# Patient Record
Sex: MALE | Employment: STUDENT | URBAN - METROPOLITAN AREA
[De-identification: names, ages, dates, MRNs, and addresses within clinical notes are randomized per-mention and may not be internally consistent; named-entity substitution may affect disease eponyms.]

---
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
## 2019-05-20 ENCOUNTER — HOSPITAL ENCOUNTER (OUTPATIENT)
Age: 19
Discharge: HOME | End: 2019-05-20
Payer: COMMERCIAL

## 2019-05-20 VITALS
HEART RATE: 74 BPM | OXYGEN SATURATION: 100 % | SYSTOLIC BLOOD PRESSURE: 143 MMHG | BODY MASS INDEX: 30.31 KG/M2 | HEIGHT: 68 IN | DIASTOLIC BLOOD PRESSURE: 66 MMHG | RESPIRATION RATE: 20 BRPM | TEMPERATURE: 98 F | WEIGHT: 200 LBS

## 2019-05-20 DIAGNOSIS — H65.92 LEFT NON-SUPPURATIVE OTITIS MEDIA: Primary | ICD-10-CM

## 2019-05-20 PROCEDURE — 99203 OFFICE O/P NEW LOW 30 MIN: CPT

## 2019-05-20 PROCEDURE — 99204 OFFICE O/P NEW MOD 45 MIN: CPT

## 2019-05-20 RX ORDER — AMOXICILLIN 875 MG/1
875 TABLET, COATED ORAL 2 TIMES DAILY
Qty: 20 TABLET | Refills: 0 | Status: SHIPPED | OUTPATIENT
Start: 2019-05-20 | End: 2019-05-30

## 2019-05-21 NOTE — ED INITIAL ASSESSMENT (HPI)
Left ear pain with muffled hearing and congestion after airplane flight today. Trouble with balance. Feels lightheaded. No fever.

## 2019-05-21 NOTE — ED PROVIDER NOTES
Patient presents with:  Ear Pain      HPI:     Jorge Hardin is a 22year old male who presents with a chief complaint of left ear pain that started approximately 3 hours ago. The patient states he had some nasal congestion and then was on a plane.   He st phone: Not on file        Gets together: Not on file        Attends Sabian[employer] service: Not on file        Active member of club or organization: Not on file        Attends meetings of clubs or organizations: Not on file        Relationship status: Not on f treat the left otitis media with amoxicillin and recommend he continue supportive care and follow-up closely with his primary care doctor. Diagnosis:    ICD-10-CM    1.  Left non-suppurative otitis media H65.92        All results reviewed and discussed wit

## 2019-06-13 ENCOUNTER — OFFICE VISIT (OUTPATIENT)
Dept: FAMILY MEDICINE CLINIC | Facility: CLINIC | Age: 19
End: 2019-06-13

## 2019-06-13 VITALS
RESPIRATION RATE: 16 BRPM | TEMPERATURE: 98 F | BODY MASS INDEX: 32.42 KG/M2 | DIASTOLIC BLOOD PRESSURE: 62 MMHG | SYSTOLIC BLOOD PRESSURE: 118 MMHG | OXYGEN SATURATION: 99 % | HEART RATE: 52 BPM | HEIGHT: 67.2 IN | WEIGHT: 209 LBS

## 2019-06-13 DIAGNOSIS — Z00.00 PHYSICAL EXAM: Primary | ICD-10-CM

## 2019-06-13 DIAGNOSIS — Z11.1 ENCOUNTER FOR TB TINE TEST: ICD-10-CM

## 2019-06-13 PROCEDURE — 99385 PREV VISIT NEW AGE 18-39: CPT | Performed by: FAMILY MEDICINE

## 2019-06-13 PROCEDURE — 86580 TB INTRADERMAL TEST: CPT | Performed by: FAMILY MEDICINE

## 2019-06-13 NOTE — PATIENT INSTRUCTIONS
Normal exam today. TB skin test done today. Advice to have it read before 12 pm on Saturday.    Will bring in form

## 2019-06-13 NOTE — PROGRESS NOTES
[address]    Chief Complaint:   Patient presents with: Other: needs tb test  Physical      HPI:   Sridhar Griffith is a 22year old male who presents for an Annual Health Visit. Patient also needs TB test for his school.     Allergies bleeding  ENDOCRINE: denies excessive thirst or urination; denies unexpected wt gain or wt loss      EXAM:   /62 (BP Location: Left arm, Patient Position: Sitting, Cuff Size: large)   Pulse 52   Temp 98.2 °F (36.8 °C) (Tympanic)   Resp 16   Ht 67.2\" Saturday. Will bring in form       The patient indicates understanding of these issues and agrees to the plan.     Problem List:  Patient Active Problem List:     Routine infant or child health check      Alexi Howard MD  6/13/2019  5:43 PM    This note

## 2019-06-15 ENCOUNTER — NURSE ONLY (OUTPATIENT)
Dept: FAMILY MEDICINE CLINIC | Facility: CLINIC | Age: 19
End: 2019-06-15

## 2020-06-08 ENCOUNTER — OFFICE VISIT (OUTPATIENT)
Dept: INTERNAL MEDICINE CLINIC | Facility: CLINIC | Age: 20
End: 2020-06-08

## 2020-06-08 VITALS
HEIGHT: 68 IN | OXYGEN SATURATION: 99 % | TEMPERATURE: 96 F | WEIGHT: 180 LBS | DIASTOLIC BLOOD PRESSURE: 82 MMHG | HEART RATE: 91 BPM | BODY MASS INDEX: 27.28 KG/M2 | SYSTOLIC BLOOD PRESSURE: 140 MMHG | RESPIRATION RATE: 14 BRPM

## 2020-06-08 DIAGNOSIS — F17.200 NICOTINE USE DISORDER: ICD-10-CM

## 2020-06-08 DIAGNOSIS — J30.9 ALLERGIC RHINITIS, UNSPECIFIED SEASONALITY, UNSPECIFIED TRIGGER: ICD-10-CM

## 2020-06-08 DIAGNOSIS — T78.2XXS ANAPHYLAXIS, SEQUELA: Primary | ICD-10-CM

## 2020-06-08 PROCEDURE — 99204 OFFICE O/P NEW MOD 45 MIN: CPT | Performed by: INTERNAL MEDICINE

## 2020-06-08 RX ORDER — EPINEPHRINE 0.15 MG/.3ML
0.3 INJECTION INTRAMUSCULAR AS NEEDED
Qty: 2 EACH | Refills: 1 | Status: SHIPPED | OUTPATIENT
Start: 2020-06-08 | End: 2021-06-08

## 2020-06-08 RX ORDER — EPINEPHRINE 0.3 MG/.3ML
0.3 INJECTION SUBCUTANEOUS ONCE
COMMUNITY
End: 2020-07-23

## 2020-06-08 RX ORDER — CETIRIZINE HYDROCHLORIDE 10 MG/1
10 TABLET ORAL DAILY
COMMUNITY

## 2020-06-08 NOTE — PROGRESS NOTES
Ted Casanova is a 23year old male. HPI:   Patient presents with:  Establish Care: Pt would like to establish care with PCP. Pt has not had PCP before. Pt requesting referral for allergist after anaphylactic reaction last year to grape juice.        Valentine Saldivar Drinks per session: 5 or 6    Drug use: Yes      Types: Cannabis, Ecstasy       Medications (Active prior to today's visit):  Current Outpatient Medications   Medication Sig Dispense Refill   • EPINEPHrine (EPIPEN 2-CARMEN) 0.3 MG/0.3ML Injection Solution Aut non-tender and non-distended  Extremities: no clubbing, cyanosis or edema  Vascular: no carotid bruits; DP/PT 2/2  Musculoskeletal: Motor 5/5 upper and lower extremities  Neurological: cranial nerves II-XII intact; light touch and proprioception intact  Sk

## 2020-06-15 ENCOUNTER — NURSE ONLY (OUTPATIENT)
Dept: ALLERGY | Facility: CLINIC | Age: 20
End: 2020-06-15

## 2020-06-15 ENCOUNTER — OFFICE VISIT (OUTPATIENT)
Dept: ALLERGY | Facility: CLINIC | Age: 20
End: 2020-06-15

## 2020-06-15 VITALS
WEIGHT: 180 LBS | TEMPERATURE: 97 F | DIASTOLIC BLOOD PRESSURE: 72 MMHG | SYSTOLIC BLOOD PRESSURE: 156 MMHG | HEIGHT: 68 IN | OXYGEN SATURATION: 100 % | HEART RATE: 76 BPM | BODY MASS INDEX: 27.28 KG/M2

## 2020-06-15 DIAGNOSIS — J30.89 ENVIRONMENTAL AND SEASONAL ALLERGIES: ICD-10-CM

## 2020-06-15 DIAGNOSIS — J30.1 SEASONAL ALLERGIC RHINITIS DUE TO POLLEN: ICD-10-CM

## 2020-06-15 DIAGNOSIS — T78.40XA ALLERGIC REACTION, INITIAL ENCOUNTER: Primary | ICD-10-CM

## 2020-06-15 DIAGNOSIS — Z91.018 FOOD ALLERGY: ICD-10-CM

## 2020-06-15 DIAGNOSIS — J30.81 ALLERGIC RHINITIS DUE TO ANIMAL HAIR AND DANDER: ICD-10-CM

## 2020-06-15 PROCEDURE — 95024 IQ TESTS W/ALLERGENIC XTRCS: CPT | Performed by: ALLERGY & IMMUNOLOGY

## 2020-06-15 PROCEDURE — 95004 PERQ TESTS W/ALRGNC XTRCS: CPT | Performed by: ALLERGY & IMMUNOLOGY

## 2020-06-15 PROCEDURE — 99244 OFF/OP CNSLTJ NEW/EST MOD 40: CPT | Performed by: ALLERGY & IMMUNOLOGY

## 2020-06-15 NOTE — PATIENT INSTRUCTIONS
1. AR  Handouts on allergies and avoidance measures provided and reviewed including the potential treatment option of immunotherapy  Continue with Zyrtec 10 mg once a day as it is providing symptomatic relief of his predominant symptom of sneezing  May add

## 2020-06-15 NOTE — PROGRESS NOTES
Dimitri Brizuela is a 23year old male. HPI:   Patient presents with:   Allergies: per patient has a reaction last summer from [facility] grape juice, this past week had welches fruit snacks, felt weak, flushed, took Benadryl and felt better but did go to the Maternal Grandmother         Afib   • Cancer Paternal Grandmother         Thyroid   • Diabetes Neg    • Hypertension Neg    • Lipids Neg       Social History: Social History    Tobacco Use      Smoking status: Current Every Day Smoker      Smokeless tobacc NC/Atraumatic  Eyes/Vision: conjunctiva and lids are normal extraocular motion is intact   Ears/Audiometry: tympanic membranes are normal bilaterally hearing is grossly intact  Nose/Mouth/Throat: nose and throat are clear mucous membranes are moist   Neck/ it is providing symptomatic relief of his predominant symptom of sneezing  May add Flonase or Nasacort sprays per nostril once a day if refractory. May take up to 3 to 5 days to take full effect          2.  Food allergy/oas   Continue to avoid those foods

## 2020-07-23 ENCOUNTER — TELEPHONE (OUTPATIENT)
Dept: INTERNAL MEDICINE CLINIC | Facility: CLINIC | Age: 20
End: 2020-07-23

## 2020-07-23 RX ORDER — EPINEPHRINE 0.3 MG/.3ML
0.3 INJECTION SUBCUTANEOUS ONCE
Qty: 2 EACH | Refills: 1 | Status: SHIPPED | OUTPATIENT
Start: 2020-07-23 | End: 2020-07-23

## 2020-07-23 RX ORDER — EPINEPHRINE 0.3 MG/.3ML
0.3 INJECTION SUBCUTANEOUS ONCE
Qty: 1 EACH | Refills: 0 | Status: SHIPPED | OUTPATIENT
Start: 2020-07-23 | End: 2020-07-23

## 2020-07-23 NOTE — TELEPHONE ENCOUNTER
Aristides Singh calling to check on the medication below. [place] questioning the medication as the 0.15 mg is technically the 'arabella' version of he medication and is usually prescribed to people with a weight of 60lbs or less.      EPINEPHrine 0.15 MG/0.3ML Injec

## 2020-07-23 NOTE — TELEPHONE ENCOUNTER
Olga Lidia Acevedo called and informed new Rx sent for Epipen refilled with written order for 0.3mg noted in notes from office visit on 6/8/2020.

## 2020-07-24 NOTE — PROGRESS NOTES
Ted Casanova is a 23year old male.     HPI:   Patient presents with:  ADD: Patient wants to discuss ADD or ADHD and possible referrals      22 y/o M who reports long history of attention deficit symptoms; symptoms started in 2nd grade; he had increased ps nasal drainage  Eyes:  Negative for eye discharge and vision loss  Cardiovascular:  Negative for chest pain; negative palpitations  Respiratory:  Negative for cough, dyspnea and wheezing  Endocrine:  Negative for abnormal sleep patterns, increased activity allergist Dr De Los Santos Certain     Nicotine dependence  vapes nicotine daily; also smokes cannabis recreationally; recommended cessation of nicotine use     Allergic rhinitis  On Zyrtec 10 mg po qD        Cell [phone]  RTC 3 mos         Orders This Visit:  No ord

## 2020-08-03 NOTE — PROGRESS NOTES
Virtual Telephone Check-In    Carlo Ingram verbally consents to a Virtual/Telephone Check-In visit on 08/03/20. Patient has been referred to the [facility] website at [email or web address] to review the yearly Consent to Treat document.     Patient [place]

## 2020-09-28 ENCOUNTER — TELEPHONE (OUTPATIENT)
Dept: INTERNAL MEDICINE CLINIC | Facility: CLINIC | Age: 20
End: 2020-09-28

## 2020-09-28 NOTE — TELEPHONE ENCOUNTER
ADHD  refilled Vyvanse 30 mg po qD , #30, 2 RF; ERx sent to [place] in [place] at [facility]; please call pt

## 2020-09-28 NOTE — TELEPHONE ENCOUNTER
To MD:  The above refill request is for a controlled substance. Please review pended medication order. Print and sign for staff to fax to pharmacy or prescribe electronically.       [place]30/20     LAST REFILLED- 9/24/2020 #30/0

## 2020-10-01 NOTE — TELEPHONE ENCOUNTER
Spoke with pharmacist who reports patient recently picked up Rx on 22nd and still has 3 remaining scripts for next 3 months on file.

## 2020-10-01 NOTE — TELEPHONE ENCOUNTER
To MD:  The above refill request is for a controlled substance. Please review pended medication order. Print and sign for staff to fax to pharmacy or prescribe electronically.       90 day pended   [place]  8/23[place]30   Last refilled- 12/25/2020 #30

## 2020-10-01 NOTE — TELEPHONE ENCOUNTER
ERx was already sent 9/24 for 3 month supply (Vyvanse 30 mg po qD , #30, 2 RF); please verify with pharmacy

## 2020-10-02 ENCOUNTER — TELEPHONE (OUTPATIENT)
Dept: INTERNAL MEDICINE CLINIC | Facility: CLINIC | Age: 20
End: 2020-10-02

## 2020-10-02 NOTE — TELEPHONE ENCOUNTER
Please call pt regarding:  Vyvanse  Pt started this 3 months ago  Pt has some questions, might like to change to something different, not sure if it is working properly  Please call pt to discuss/advise  Tasked to nursing

## 2020-10-05 NOTE — TELEPHONE ENCOUNTER
To Dr. Danae Jiménez to please advise--  Spoke to patient who reports taking his Vyvanse 30 mg every morning at around 6:30-6:45am daily. Pt states he feels more engaged in the AM, and around 1-2PM daily, patient reports \"hitting a wall\" where he noticed mood changes, mild agitation, and pt reports he feels that he just wants to be left alone. 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 Pt denies any recent stressful events or increasing anxiety. Pt states after work, he takes a nap and will not be productive until the next work day. Reports difficulty managing out of work responsibilities due to this decrease in motivation and increased feeling of needing to rest and not be bothered after work.

## 2020-10-05 NOTE — TELEPHONE ENCOUNTER
Pt. Returned call he will be available after 4:30pm today and in the morning at from 8am to 11am ph.  # [phone]  Routed to clinical

## 2020-10-08 NOTE — TELEPHONE ENCOUNTER
Pt asked that Dr Leonce Siemens please call regarding his medication question  Pt is available today before 11:00 or after 4:30  Tasked to Dr Leonce Siemens

## 2020-10-30 ENCOUNTER — TELEPHONE (OUTPATIENT)
Dept: INTERNAL MEDICINE CLINIC | Facility: CLINIC | Age: 20
End: 2020-10-30

## 2020-10-30 DIAGNOSIS — R05.9 COUGH: ICD-10-CM

## 2020-10-30 DIAGNOSIS — R09.81 SINUS CONGESTION: ICD-10-CM

## 2020-10-30 DIAGNOSIS — J02.9 PHARYNGITIS, UNSPECIFIED ETIOLOGY: ICD-10-CM

## 2020-10-30 DIAGNOSIS — Z20.822 EXPOSURE TO COVID-19 VIRUS: Primary | ICD-10-CM

## 2020-10-30 NOTE — TELEPHONE ENCOUNTER
Pt called; +exposure to COVID; +cough; +sinus congestion; +pharyngitis    rec- nares COVID; (); pt called

## 2020-10-30 NOTE — TELEPHONE ENCOUNTER
Pt. Was exposed to someone who tested positive yesterday he was with her Tuesday evening. Please advise ph.  # [phone]   Routed high to clinical

## 2020-10-30 NOTE — TELEPHONE ENCOUNTER
Spoke to patient who reports he person he was with was tested Tuesday by rapid test around 1:30pm and had a negative result.  He then saw this person Tuesday around 6pm, they did not have masks on, were probably closer than 6 feet, and were together for abo

## 2020-10-31 ENCOUNTER — APPOINTMENT (OUTPATIENT)
Dept: LAB | Age: 20
End: 2020-10-31
Attending: INTERNAL MEDICINE
Payer: COMMERCIAL

## 2020-10-31 DIAGNOSIS — J02.9 PHARYNGITIS, UNSPECIFIED ETIOLOGY: ICD-10-CM

## 2020-10-31 DIAGNOSIS — R05.9 COUGH: ICD-10-CM

## 2020-10-31 DIAGNOSIS — Z20.822 EXPOSURE TO COVID-19 VIRUS: ICD-10-CM

## 2020-10-31 DIAGNOSIS — R09.81 SINUS CONGESTION: ICD-10-CM

## 2020-11-04 ENCOUNTER — TELEPHONE (OUTPATIENT)
Dept: INTERNAL MEDICINE CLINIC | Facility: CLINIC | Age: 20
End: 2020-11-04

## 2020-11-09 ENCOUNTER — PATIENT MESSAGE (OUTPATIENT)
Dept: INTERNAL MEDICINE CLINIC | Facility: CLINIC | Age: 20
End: 2020-11-09

## 2020-11-09 ENCOUNTER — TELEPHONE (OUTPATIENT)
Dept: INTERNAL MEDICINE CLINIC | Facility: CLINIC | Age: 20
End: 2020-11-09

## 2020-11-09 NOTE — TELEPHONE ENCOUNTER
Rekha Hurtado  to Aniceto Lei MD          11/9/20 9:28 AM  I have been a two phone calls over the past month with two nurses regarding my current prescription for Vyvans and the possibility of changing it.  Ending both calls, the nurses said that I

## 2020-11-10 RX ORDER — DEXTROAMPHETAMINE SACCHARATE, AMPHETAMINE ASPARTATE, DEXTROAMPHETAMINE SULFATE AND AMPHETAMINE SULFATE 5; 5; 5; 5 MG/1; MG/1; MG/1; MG/1
20 TABLET ORAL 2 TIMES DAILY
Qty: 60 TABLET | Refills: 0 | Status: SHIPPED | OUTPATIENT
Start: 2020-11-10 | End: 2020-12-10

## 2020-11-10 RX ORDER — DEXTROAMPHETAMINE SACCHARATE, AMPHETAMINE ASPARTATE, DEXTROAMPHETAMINE SULFATE AND AMPHETAMINE SULFATE 5; 5; 5; 5 MG/1; MG/1; MG/1; MG/1
20 TABLET ORAL 2 TIMES DAILY
Qty: 60 TABLET | Refills: 0 | Status: SHIPPED | OUTPATIENT
Start: 2020-12-11 | End: 2021-01-10

## 2020-11-10 RX ORDER — DEXTROAMPHETAMINE SACCHARATE, AMPHETAMINE ASPARTATE, DEXTROAMPHETAMINE SULFATE AND AMPHETAMINE SULFATE 5; 5; 5; 5 MG/1; MG/1; MG/1; MG/1
20 TABLET ORAL 2 TIMES DAILY
Qty: 60 TABLET | Refills: 0 | Status: SHIPPED | OUTPATIENT
Start: 2021-01-11 | End: 2021-02-10

## 2020-11-10 NOTE — TELEPHONE ENCOUNTER
Patient returning Dr Love Aguiar phone call from last night    Patient is aware Dr is out of the office okay to wait

## 2020-11-10 NOTE — TELEPHONE ENCOUNTER
Imp- ADHD  Has had indolent sxs x many years with +increased distractibility, +poor concentration, and +increased psychomotor agitation  -was on Vyvanse 30 mg po qD with less distractibility and improved concentration; sxs \"wear off\" around noon    Rec-

## 2020-12-02 ENCOUNTER — OFFICE VISIT (OUTPATIENT)
Dept: INTERNAL MEDICINE CLINIC | Facility: CLINIC | Age: 20
End: 2020-12-02

## 2020-12-02 VITALS
TEMPERATURE: 98 F | SYSTOLIC BLOOD PRESSURE: 144 MMHG | OXYGEN SATURATION: 100 % | BODY MASS INDEX: 28.19 KG/M2 | WEIGHT: 186 LBS | HEART RATE: 76 BPM | HEIGHT: 68 IN | DIASTOLIC BLOOD PRESSURE: 72 MMHG

## 2020-12-02 DIAGNOSIS — F98.8 ATTENTION DEFICIT DISORDER, UNSPECIFIED HYPERACTIVITY PRESENCE: ICD-10-CM

## 2020-12-02 DIAGNOSIS — T78.2XXS ANAPHYLAXIS, SEQUELA: ICD-10-CM

## 2020-12-02 DIAGNOSIS — Z00.00 PHYSICAL EXAM, ANNUAL: Primary | ICD-10-CM

## 2020-12-02 DIAGNOSIS — J30.9 ALLERGIC RHINITIS, UNSPECIFIED SEASONALITY, UNSPECIFIED TRIGGER: ICD-10-CM

## 2020-12-02 DIAGNOSIS — F17.200 NICOTINE USE DISORDER: ICD-10-CM

## 2020-12-02 DIAGNOSIS — M79.673 PAIN OF FOOT, UNSPECIFIED LATERALITY: ICD-10-CM

## 2020-12-02 PROCEDURE — 3077F SYST BP >= 140 MM HG: CPT | Performed by: INTERNAL MEDICINE

## 2020-12-02 PROCEDURE — G0438 PPPS, INITIAL VISIT: HCPCS | Performed by: INTERNAL MEDICINE

## 2020-12-02 PROCEDURE — 3078F DIAST BP <80 MM HG: CPT | Performed by: INTERNAL MEDICINE

## 2020-12-02 PROCEDURE — 3008F BODY MASS INDEX DOCD: CPT | Performed by: INTERNAL MEDICINE

## 2020-12-02 PROCEDURE — 99395 PREV VISIT EST AGE 18-39: CPT | Performed by: INTERNAL MEDICINE

## 2020-12-02 NOTE — PROGRESS NOTES
Dimitry Chacon is a 23year old male. HPI:   Patient presents with:  Physical: Patient reports he is having some pain in his feet.        22 y/o M here for physical exam; c/o pain to feet; states ADD has responded well to Adderall 20 mg po BID with decrea Negative for ear drainage, hearing loss and nasal drainage  Eyes:  Negative for eye discharge and vision loss  Cardiovascular:  Negative for chest pain; negative palpitations  Respiratory:  Negative for cough, dyspnea and wheezing  Endocrine:  Negative for +increased psychomotor agitation  -was on Vyvanse 30 mg po qD with less distractibility, though med \"wears off\" around noon  - stopped Vyvanse on 11/9/20  -now on Adderall 20 mg po BID, #60, 1RF     Foot pain   Advised Spenco orthotic arch suport    Fati

## 2021-01-07 ENCOUNTER — TELEPHONE (OUTPATIENT)
Dept: INTERNAL MEDICINE CLINIC | Facility: CLINIC | Age: 21
End: 2021-01-07

## 2021-01-07 RX ORDER — DEXTROAMPHETAMINE SACCHARATE, AMPHETAMINE ASPARTATE, DEXTROAMPHETAMINE SULFATE AND AMPHETAMINE SULFATE 5; 5; 5; 5 MG/1; MG/1; MG/1; MG/1
20 TABLET ORAL 2 TIMES DAILY
Qty: 60 TABLET | Refills: 0 | Status: SHIPPED | OUTPATIENT
Start: 2021-01-07 | End: 2021-02-06

## 2021-01-07 RX ORDER — DEXTROAMPHETAMINE SACCHARATE, AMPHETAMINE ASPARTATE, DEXTROAMPHETAMINE SULFATE AND AMPHETAMINE SULFATE 5; 5; 5; 5 MG/1; MG/1; MG/1; MG/1
20 TABLET ORAL 2 TIMES DAILY
Qty: 60 TABLET | Refills: 0 | Status: SHIPPED | OUTPATIENT
Start: 2021-02-07 | End: 2021-03-09

## 2021-01-07 RX ORDER — DEXTROAMPHETAMINE SACCHARATE, AMPHETAMINE ASPARTATE, DEXTROAMPHETAMINE SULFATE AND AMPHETAMINE SULFATE 5; 5; 5; 5 MG/1; MG/1; MG/1; MG/1
20 TABLET ORAL 2 TIMES DAILY
Qty: 60 TABLET | Refills: 0 | Status: SHIPPED | OUTPATIENT
Start: 2021-03-10 | End: 2021-04-01

## 2021-01-07 NOTE — TELEPHONE ENCOUNTER
Patient is calling he will be leaving town tomorrow evening  His prescription for Aderall 20 mg can't be refilled until 1/10 he needs to pick the medication up early to take with him    Patient called the pharmacy they told him to call the office and have

## 2021-01-07 NOTE — TELEPHONE ENCOUNTER
Imp- ADHD     Rec- ERx sent to [place] in [place] at [employer] [facility] for Adderall 20 mg po BID, #60, 2RF; note to pharmacy \"may refill early\"    Please notify pt

## 2021-03-09 ENCOUNTER — TELEPHONE (OUTPATIENT)
Dept: INTERNAL MEDICINE CLINIC | Facility: CLINIC | Age: 21
End: 2021-03-09

## 2021-03-09 ENCOUNTER — PATIENT MESSAGE (OUTPATIENT)
Dept: INTERNAL MEDICINE CLINIC | Facility: CLINIC | Age: 21
End: 2021-03-09

## 2021-03-09 RX ORDER — DEXTROAMPHETAMINE SACCHARATE, AMPHETAMINE ASPARTATE, DEXTROAMPHETAMINE SULFATE AND AMPHETAMINE SULFATE 5; 5; 5; 5 MG/1; MG/1; MG/1; MG/1
20 TABLET ORAL 2 TIMES DAILY
Qty: 60 TABLET | Refills: 0 | OUTPATIENT
Start: 2021-03-09 | End: 2021-04-08

## 2021-03-09 NOTE — TELEPHONE ENCOUNTER
From: May Gentile  To: [place] Moyer MD  Sent: 3/9/2021 10:19 AM CST  Subject: Prescription Question    I was curious about getting my Adderall prescription updated for the next few months because I will be receiving my last one today/tomorrow.

## 2021-03-09 NOTE — TELEPHONE ENCOUNTER
To Dr. Pennington Raudel---    Pt requesting refill panel. Pended.      LOV: 12/2020  Last rx sent 3/9/21

## 2021-03-09 NOTE — TELEPHONE ENCOUNTER
Current refill request refused due to refill is either a duplicate request or has active refills at the pharmacy. Check previous templates.     Requested Prescriptions     Refused Prescriptions Disp Refills   • amphetamine-dextroamphetamine (ADDERALL) 20 M

## 2021-03-10 RX ORDER — DEXTROAMPHETAMINE SACCHARATE, AMPHETAMINE ASPARTATE, DEXTROAMPHETAMINE SULFATE AND AMPHETAMINE SULFATE 5; 5; 5; 5 MG/1; MG/1; MG/1; MG/1
20 TABLET ORAL 2 TIMES DAILY
Qty: 60 TABLET | Refills: 0 | Status: SHIPPED | OUTPATIENT
Start: 2021-04-09 | End: 2021-04-01

## 2021-03-10 RX ORDER — DEXTROAMPHETAMINE SACCHARATE, AMPHETAMINE ASPARTATE, DEXTROAMPHETAMINE SULFATE AND AMPHETAMINE SULFATE 5; 5; 5; 5 MG/1; MG/1; MG/1; MG/1
20 TABLET ORAL 2 TIMES DAILY
Qty: 60 TABLET | Refills: 0 | Status: SHIPPED | OUTPATIENT
Start: 2021-05-10 | End: 2021-04-01

## 2021-03-11 NOTE — TELEPHONE ENCOUNTER
Imp- ADHD     Rec- ERx sent to [place] in [place] at [facility] for Adderall 20 mg po BID, #60, 1RF;

## 2021-04-01 ENCOUNTER — TELEPHONE (OUTPATIENT)
Dept: INTERNAL MEDICINE CLINIC | Facility: CLINIC | Age: 21
End: 2021-04-01

## 2021-04-01 RX ORDER — DEXTROAMPHETAMINE SACCHARATE, AMPHETAMINE ASPARTATE, DEXTROAMPHETAMINE SULFATE AND AMPHETAMINE SULFATE 3.75; 3.75; 3.75; 3.75 MG/1; MG/1; MG/1; MG/1
15 TABLET ORAL 3 TIMES DAILY
Qty: 90 TABLET | Refills: 0 | Status: SHIPPED | OUTPATIENT
Start: 2021-04-01 | End: 2021-04-05

## 2021-04-01 RX ORDER — DEXTROAMPHETAMINE SACCHARATE, AMPHETAMINE ASPARTATE, DEXTROAMPHETAMINE SULFATE AND AMPHETAMINE SULFATE 3.75; 3.75; 3.75; 3.75 MG/1; MG/1; MG/1; MG/1
15 TABLET ORAL 3 TIMES DAILY
Qty: 90 TABLET | Refills: 0 | Status: SHIPPED | OUTPATIENT
Start: 2021-05-02 | End: 2021-05-24

## 2021-04-01 RX ORDER — DEXTROAMPHETAMINE SACCHARATE, AMPHETAMINE ASPARTATE, DEXTROAMPHETAMINE SULFATE AND AMPHETAMINE SULFATE 3.75; 3.75; 3.75; 3.75 MG/1; MG/1; MG/1; MG/1
15 TABLET ORAL 3 TIMES DAILY
Qty: 90 TABLET | Refills: 0 | Status: SHIPPED | OUTPATIENT
Start: 2021-06-02 | End: 2021-05-24

## 2021-04-01 NOTE — TELEPHONE ENCOUNTER
ADHD  Has had indolent sxs x many years with +increased distractibility, +poor concentration, and +increased psychomotor agitation  -improved on Vyvanse 30 mg po qD with less distractibility and improved concentration; CPM for now  -Has had indolent sxs x

## 2021-04-01 NOTE — TELEPHONE ENCOUNTER
----- Message from Sridhar Griffith sent at 4/1/2021  4:59 PM CDT -----  Regarding: Prescription Question  Contact: [phone]  I am curious about potentially altering my current adderall prescription.  I recently have changed jobs and my work goes further i

## 2021-04-02 NOTE — TELEPHONE ENCOUNTER
Discussed with DR. CRENSHAW and pt - options (as pt is not out of med)  Insurance for BID and cash for every day doses;  PA process;   Change to extended release and IR tablet daily  Pt will get the first month from the pharmacy and I will process the PA as decide

## 2021-04-02 NOTE — TELEPHONE ENCOUNTER
Prior [employer] received from [place] for Adderall  Max daily dose of 2  Plan # [phone]  ID# [id number]    Placed in purple folder

## 2021-04-05 RX ORDER — DEXTROAMPHETAMINE SACCHARATE, AMPHETAMINE ASPARTATE, DEXTROAMPHETAMINE SULFATE AND AMPHETAMINE SULFATE 3.75; 3.75; 3.75; 3.75 MG/1; MG/1; MG/1; MG/1
15 TABLET ORAL 3 TIMES DAILY
Qty: 90 TABLET | Refills: 0 | Status: CANCELLED | OUTPATIENT
Start: 2021-05-06 | End: 2021-06-05

## 2021-04-05 RX ORDER — DEXTROAMPHETAMINE SACCHARATE, AMPHETAMINE ASPARTATE, DEXTROAMPHETAMINE SULFATE AND AMPHETAMINE SULFATE 3.75; 3.75; 3.75; 3.75 MG/1; MG/1; MG/1; MG/1
15 TABLET ORAL 3 TIMES DAILY
Qty: 90 TABLET | Refills: 0 | Status: CANCELLED | OUTPATIENT
Start: 2021-04-05 | End: 2021-05-05

## 2021-04-05 RX ORDER — DEXTROAMPHETAMINE SACCHARATE, AMPHETAMINE ASPARTATE, DEXTROAMPHETAMINE SULFATE AND AMPHETAMINE SULFATE 3.75; 3.75; 3.75; 3.75 MG/1; MG/1; MG/1; MG/1
15 TABLET ORAL 3 TIMES DAILY
Qty: 90 TABLET | Refills: 0 | Status: SHIPPED | OUTPATIENT
Start: 2021-04-05 | End: 2021-04-05

## 2021-04-05 RX ORDER — DEXTROAMPHETAMINE SACCHARATE, AMPHETAMINE ASPARTATE, DEXTROAMPHETAMINE SULFATE AND AMPHETAMINE SULFATE 3.75; 3.75; 3.75; 3.75 MG/1; MG/1; MG/1; MG/1
15 TABLET ORAL 3 TIMES DAILY
Qty: 90 TABLET | Refills: 0 | Status: CANCELLED | OUTPATIENT
Start: 2021-06-06 | End: 2021-07-06

## 2021-04-05 RX ORDER — DEXTROAMPHETAMINE SACCHARATE, AMPHETAMINE ASPARTATE, DEXTROAMPHETAMINE SULFATE AND AMPHETAMINE SULFATE 3.75; 3.75; 3.75; 3.75 MG/1; MG/1; MG/1; MG/1
15 TABLET ORAL 3 TIMES DAILY
Qty: 90 TABLET | Refills: 0 | Status: SHIPPED | OUTPATIENT
Start: 2021-04-05 | End: 2021-05-05

## 2021-04-05 NOTE — TELEPHONE ENCOUNTER
Spoke to pt - explained for future reference, with the regulations on these prescriptions, we cannot send/void/send ; He will need to check prior to request;  I specifically asked if they have #90 tabs because otherwise he will have to forfeit tabs;   He w

## 2021-04-05 NOTE — TELEPHONE ENCOUNTER
Dr. Christopher Pemberton, please advise. I called [facility] in [place] and they are unable to transfer a script for Adderall and will need a new one sent to them directly. They do have the 15 mg tablets in stock.

## 2021-04-05 NOTE — TELEPHONE ENCOUNTER
Patient calling today to see if the office can change the prescription below to another pharmacy. Patient was informed the script was sent to the [facility] on [address] on 4/1/2021.  Patient verbalized that he knew this but the pharmacy informed hi

## 2021-04-05 NOTE — TELEPHONE ENCOUNTER
Called [facility] on [address] to confirm and void Rx sent there; They are out of stock and did not fill Rx  Pended for DR. CRENSHAW to send to [facility] on [address] (only 4/1/21 Rx)

## 2021-04-05 NOTE — TELEPHONE ENCOUNTER
[facility] on [address] called, they do not have medication in stock  Pt did not call them prior to having RX transferred per pharmacy  Pt called [facility] on [address] and said he called [facility] and they do have medication in stock  Please transfer RX

## 2021-04-07 NOTE — TELEPHONE ENCOUNTER
Prior Authorization rec'd from [facility] for:  Amphetamine-Dextroamphetamine  Fax placed in purple folder  Tasked to [employer]

## 2021-04-12 NOTE — TELEPHONE ENCOUNTER
[facility] rec'd for:  Amphetamine-Dextroamphetamine    \"the dose you are requesting is more than the highest quantity covered by your pharmacy plan.   Your prescriber must provide information to support the higher requested quantity\"    Fax placed in purple

## 2021-05-24 ENCOUNTER — PATIENT MESSAGE (OUTPATIENT)
Dept: INTERNAL MEDICINE CLINIC | Facility: CLINIC | Age: 21
End: 2021-05-24

## 2021-05-24 ENCOUNTER — TELEPHONE (OUTPATIENT)
Dept: INTERNAL MEDICINE CLINIC | Facility: CLINIC | Age: 21
End: 2021-05-24

## 2021-05-24 RX ORDER — DEXTROAMPHETAMINE SACCHARATE, AMPHETAMINE ASPARTATE, DEXTROAMPHETAMINE SULFATE AND AMPHETAMINE SULFATE 7.5; 7.5; 7.5; 7.5 MG/1; MG/1; MG/1; MG/1
30 TABLET ORAL 2 TIMES DAILY
Qty: 60 TABLET | Refills: 0 | Status: SHIPPED | OUTPATIENT
Start: 2021-05-24 | End: 2021-06-23

## 2021-05-24 RX ORDER — DEXTROAMPHETAMINE SACCHARATE, AMPHETAMINE ASPARTATE, DEXTROAMPHETAMINE SULFATE AND AMPHETAMINE SULFATE 7.5; 7.5; 7.5; 7.5 MG/1; MG/1; MG/1; MG/1
30 TABLET ORAL 2 TIMES DAILY
Qty: 60 TABLET | Refills: 0 | Status: SHIPPED | OUTPATIENT
Start: 2021-07-25 | End: 2021-08-24

## 2021-05-24 RX ORDER — DEXTROAMPHETAMINE SACCHARATE, AMPHETAMINE ASPARTATE, DEXTROAMPHETAMINE SULFATE AND AMPHETAMINE SULFATE 7.5; 7.5; 7.5; 7.5 MG/1; MG/1; MG/1; MG/1
30 TABLET ORAL 2 TIMES DAILY
Qty: 60 TABLET | Refills: 0 | Status: SHIPPED | OUTPATIENT
Start: 2021-06-24 | End: 2021-07-24

## 2021-05-24 NOTE — TELEPHONE ENCOUNTER
From: Naima Brandt  To: Mariposa Mixon MD  Sent: 5/24/2021 1:16 PM CDT  Subject: Prescription Question    Unfortunately, my insurance does not cover the cost of my new prescription.  However, I am finding a this new prescription incredibly beneficial. I

## 2021-05-24 NOTE — TELEPHONE ENCOUNTER
Imp- ADD    Insurance will not cover Adderall 15 mg po TID    Rec- change Rx to Adderall 30 mg po BID #60, 2 RF    [place] in [place] at [place] / [address]    Pt called; ERx sent

## 2021-05-24 NOTE — TELEPHONE ENCOUNTER
----- Message from Ayanna Stern sent at 5/24/2021  1:16 PM CDT -----  Regarding: Prescription Question  Contact: [phone]  Unfortunately, my insurance does not cover the cost of my new prescription.  However, I am finding a this new prescription incred

## 2021-06-14 ENCOUNTER — TELEPHONE (OUTPATIENT)
Dept: INTERNAL MEDICINE CLINIC | Facility: CLINIC | Age: 21
End: 2021-06-14

## 2021-06-14 ENCOUNTER — HOSPITAL ENCOUNTER (EMERGENCY)
Facility: HOSPITAL | Age: 21
Discharge: HOME | End: 2021-06-14
Attending: EMERGENCY MEDICINE
Payer: COMMERCIAL

## 2021-06-14 VITALS
RESPIRATION RATE: 16 BRPM | SYSTOLIC BLOOD PRESSURE: 136 MMHG | TEMPERATURE: 99 F | HEART RATE: 64 BPM | OXYGEN SATURATION: 100 % | DIASTOLIC BLOOD PRESSURE: 77 MMHG

## 2021-06-14 DIAGNOSIS — R25.3 MUSCLE FASCICULATION: Primary | ICD-10-CM

## 2021-06-14 PROCEDURE — 99282 EMERGENCY DEPT VISIT SF MDM: CPT

## 2021-06-14 NOTE — TELEPHONE ENCOUNTER
Patient calling to speak to Dr. Sd Collins for advise. Has been feeling pulsing/contractions in stomach/kidney area. Happens occasionally. Not painful. Has been going on for a few months.     Best call back number [phone]

## 2021-06-14 NOTE — ED INITIAL ASSESSMENT (HPI)
Dax Acosta is here for evaluation of episode of \"twitching\" in right-side abdomen for past several months. C/o fatigue for past several days.

## 2021-06-14 NOTE — TELEPHONE ENCOUNTER
Pt reports developing right sided lower rib cage muscle twitching, described also as \"pulsating\" and \"contractions\" by the patient. Reports this has began 1 month ago and for the past few days has been experiencing this daily.  Reports pain level is 2/1

## 2021-06-15 NOTE — TELEPHONE ENCOUNTER
Patient was seen in the emergency room last night. Left message to call back to see how he is feeling.

## 2021-06-15 NOTE — TELEPHONE ENCOUNTER
Patient is calling back after missing a call from nursing. Patient states he is feeling perfectly fine, everything checked out good, no issues. Patient stated he does not need a follow up appointment.

## 2021-06-15 NOTE — TELEPHONE ENCOUNTER
Message noted.  Per ER documentation, patient was instructed on PCP follow up as follows:      \"Schedule an appointment as soon as possible for a visit  As needed\"

## 2021-06-15 NOTE — ED PROVIDER NOTES
Patient Seen in: [facility] AND [facility] Emergency Department    History   Patient presents with:  Abdomen/Flank Pain      HPI    patient presents to the ED after complaining of intermittent twitching since symptoms to his right upper abdomen, lower chest wall Temp src    SpO2 100 %   O2 Device        All measures to prevent infection transmission during my interaction with the patient were taken. The patient was already wearing a droplet mask on my arrival to the room.  Personal protective equipment including Diagnosis/ Diagnostic Considerations: Muscle strain, fasciculations, spasms    Medical Record Review: I personally reviewed available prior medical records for any recent pertinent discharge summaries, testing, and procedures and reviewed those reports.

## 2021-07-16 ENCOUNTER — PATIENT MESSAGE (OUTPATIENT)
Dept: INTERNAL MEDICINE CLINIC | Facility: CLINIC | Age: 21
End: 2021-07-16

## 2021-07-16 ENCOUNTER — TELEPHONE (OUTPATIENT)
Dept: INTERNAL MEDICINE CLINIC | Facility: CLINIC | Age: 21
End: 2021-07-16

## 2021-07-16 RX ORDER — DEXTROAMPHETAMINE SACCHARATE, AMPHETAMINE ASPARTATE, DEXTROAMPHETAMINE SULFATE AND AMPHETAMINE SULFATE 7.5; 7.5; 7.5; 7.5 MG/1; MG/1; MG/1; MG/1
30 TABLET ORAL 2 TIMES DAILY
Qty: 60 TABLET | Refills: 0 | Status: SHIPPED | OUTPATIENT
Start: 2021-07-16

## 2021-07-16 NOTE — TELEPHONE ENCOUNTER
Imp- ADD     on Adderall 30 mg po BID #60,      [place] in [place] at [place] / [place]     May refill early    ERx sent

## 2021-07-16 NOTE — TELEPHONE ENCOUNTER
----- Message from Carlo Ingram sent at 7/16/2021  6:42 AM CDT -----  Regarding: Prescription Question  Contact: [phone]  I am going out of town on Tuesday 7/20 and I will not be back in town until the Friday 7/30.  My prescription is scheduled to be

## 2021-07-16 NOTE — TELEPHONE ENCOUNTER
From: Sylvia Niño  To: Floyd Rolon MD  Sent: 7/16/2021 6:42 AM CDT  Subject: Prescription Question    I am going out of town on Tuesday 7/20 and I will not be back in town until the Friday 7/30. My prescription is scheduled to be refilled on 7/25.

## 2021-08-23 ENCOUNTER — TELEPHONE (OUTPATIENT)
Dept: INTERNAL MEDICINE CLINIC | Facility: CLINIC | Age: 21
End: 2021-08-23

## 2021-08-23 RX ORDER — DEXTROAMPHETAMINE SACCHARATE, AMPHETAMINE ASPARTATE, DEXTROAMPHETAMINE SULFATE AND AMPHETAMINE SULFATE 7.5; 7.5; 7.5; 7.5 MG/1; MG/1; MG/1; MG/1
30 TABLET ORAL 2 TIMES DAILY
Qty: 60 TABLET | Refills: 0 | Status: SHIPPED | OUTPATIENT
Start: 2021-09-25 | End: 2021-10-25

## 2021-08-23 RX ORDER — DEXTROAMPHETAMINE SACCHARATE, AMPHETAMINE ASPARTATE, DEXTROAMPHETAMINE SULFATE AND AMPHETAMINE SULFATE 7.5; 7.5; 7.5; 7.5 MG/1; MG/1; MG/1; MG/1
30 TABLET ORAL 2 TIMES DAILY
Qty: 60 TABLET | Refills: 0 | Status: SHIPPED | OUTPATIENT
Start: 2021-08-25 | End: 2021-09-24

## 2021-08-23 RX ORDER — DEXTROAMPHETAMINE SACCHARATE, AMPHETAMINE ASPARTATE, DEXTROAMPHETAMINE SULFATE AND AMPHETAMINE SULFATE 7.5; 7.5; 7.5; 7.5 MG/1; MG/1; MG/1; MG/1
30 TABLET ORAL 2 TIMES DAILY
Qty: 60 TABLET | Refills: 0 | Status: CANCELLED | OUTPATIENT
Start: 2021-08-23 | End: 2021-09-22

## 2021-08-23 RX ORDER — DEXTROAMPHETAMINE SACCHARATE, AMPHETAMINE ASPARTATE, DEXTROAMPHETAMINE SULFATE AND AMPHETAMINE SULFATE 7.5; 7.5; 7.5; 7.5 MG/1; MG/1; MG/1; MG/1
30 TABLET ORAL 2 TIMES DAILY
Qty: 60 TABLET | Refills: 0 | Status: SHIPPED | OUTPATIENT
Start: 2021-10-26 | End: 2021-11-25

## 2021-08-23 NOTE — TELEPHONE ENCOUNTER
To MD:  The above refill request is for a controlled substance. Please review pended medication order. Print and sign for staff to fax to pharmacy or prescribe electronically.     Last office visit:12/2/2020  Last time refill sent and quantity/refills:7/

## 2021-08-23 NOTE — TELEPHONE ENCOUNTER
Imp- ADD     on Adderall 30 mg po BID #60, 2 RF     [place] in [facility] at [place] / [address] [place] [address] sent

## 2021-09-09 RX ORDER — DEXTROAMPHETAMINE SACCHARATE, AMPHETAMINE ASPARTATE, DEXTROAMPHETAMINE SULFATE AND AMPHETAMINE SULFATE 7.5; 7.5; 7.5; 7.5 MG/1; MG/1; MG/1; MG/1
30 TABLET ORAL 2 TIMES DAILY
Qty: 60 TABLET | Refills: 0 | Status: SHIPPED | OUTPATIENT
Start: 2021-11-23 | End: 2021-12-23

## 2021-09-09 RX ORDER — DEXTROAMPHETAMINE SACCHARATE, AMPHETAMINE ASPARTATE, DEXTROAMPHETAMINE SULFATE AND AMPHETAMINE SULFATE 7.5; 7.5; 7.5; 7.5 MG/1; MG/1; MG/1; MG/1
30 TABLET ORAL 2 TIMES DAILY
Qty: 60 TABLET | Refills: 0 | Status: SHIPPED | OUTPATIENT
Start: 2021-10-24 | End: 2021-11-23

## 2021-09-09 RX ORDER — DEXTROAMPHETAMINE SACCHARATE, AMPHETAMINE ASPARTATE, DEXTROAMPHETAMINE SULFATE AND AMPHETAMINE SULFATE 7.5; 7.5; 7.5; 7.5 MG/1; MG/1; MG/1; MG/1
30 TABLET ORAL 2 TIMES DAILY
Qty: 60 TABLET | Refills: 0 | OUTPATIENT
Start: 2021-09-09 | End: 2021-10-09

## 2021-09-09 RX ORDER — DEXTROAMPHETAMINE SACCHARATE, AMPHETAMINE ASPARTATE, DEXTROAMPHETAMINE SULFATE AND AMPHETAMINE SULFATE 7.5; 7.5; 7.5; 7.5 MG/1; MG/1; MG/1; MG/1
30 TABLET ORAL 2 TIMES DAILY
Qty: 60 TABLET | Refills: 0 | Status: SHIPPED | OUTPATIENT
Start: 2021-09-24 | End: 2021-10-24

## 2021-09-09 NOTE — TELEPHONE ENCOUNTER
Imp- ADD     on Adderall 30 mg po BID #60, 2 RF     Rx printed starting 9/24/21; pt called;  Rx left at window for pick-up

## 2021-09-09 NOTE — TELEPHONE ENCOUNTER
Pt. Called. He will be moving to [place] for the school year. He is asking if his Adderall Prescription can be sent to a pharmacy down there while he is at school? If so, he would like it to go to [facility] in [place], [place].   Francisco phone number

## 2021-09-10 NOTE — TELEPHONE ENCOUNTER
Current refill request refused due to refill is either a duplicate request or has active refills at the pharmacy. Check previous templates.     Requested Prescriptions     Refused Prescriptions Disp Refills   • amphetamine-dextroamphetamine (ADDERALL) 30 M

## 2022-01-07 RX ORDER — DEXTROAMPHETAMINE SACCHARATE, AMPHETAMINE ASPARTATE, DEXTROAMPHETAMINE SULFATE AND AMPHETAMINE SULFATE 7.5; 7.5; 7.5; 7.5 MG/1; MG/1; MG/1; MG/1
30 TABLET ORAL 2 TIMES DAILY
Qty: 60 TABLET | Refills: 0 | Status: SHIPPED | OUTPATIENT
Start: 2022-03-10 | End: 2022-04-09

## 2022-01-07 RX ORDER — DEXTROAMPHETAMINE SACCHARATE, AMPHETAMINE ASPARTATE, DEXTROAMPHETAMINE SULFATE AND AMPHETAMINE SULFATE 7.5; 7.5; 7.5; 7.5 MG/1; MG/1; MG/1; MG/1
30 TABLET ORAL 2 TIMES DAILY
Qty: 60 TABLET | Refills: 0 | Status: SHIPPED | OUTPATIENT
Start: 2022-02-07 | End: 2022-03-09

## 2022-01-07 RX ORDER — DEXTROAMPHETAMINE SACCHARATE, AMPHETAMINE ASPARTATE, DEXTROAMPHETAMINE SULFATE AND AMPHETAMINE SULFATE 7.5; 7.5; 7.5; 7.5 MG/1; MG/1; MG/1; MG/1
30 TABLET ORAL 2 TIMES DAILY
Qty: 60 TABLET | Refills: 0 | Status: SHIPPED | OUTPATIENT
Start: 2022-01-07 | End: 2022-02-06

## 2022-01-07 RX ORDER — DEXTROAMPHETAMINE SACCHARATE, AMPHETAMINE ASPARTATE, DEXTROAMPHETAMINE SULFATE AND AMPHETAMINE SULFATE 7.5; 7.5; 7.5; 7.5 MG/1; MG/1; MG/1; MG/1
30 TABLET ORAL 2 TIMES DAILY
Qty: 60 TABLET | Refills: 0 | Status: CANCELLED | OUTPATIENT
Start: 2022-01-07

## 2022-01-07 NOTE — TELEPHONE ENCOUNTER
Patient is currently living in [facility] for the next few months, he will look at his schedule and call on Monday to schedule    Patient would prefer to have this sent [facility]

## 2022-01-07 NOTE — TELEPHONE ENCOUNTER
To Dr. Yvonne Caballero---    The above refill request is for a controlled substance. Please review pended medication order.        LOV: 12/2020  Prescribed: #60 11/22/21  : n/a    Single month pended as pt overdue for physical.     TO FD----    Please reach out to pt

## 2022-02-21 ENCOUNTER — PATIENT MESSAGE (OUTPATIENT)
Dept: INTERNAL MEDICINE CLINIC | Facility: CLINIC | Age: 22
End: 2022-02-21

## 2022-02-21 ENCOUNTER — TELEPHONE (OUTPATIENT)
Dept: INTERNAL MEDICINE CLINIC | Facility: CLINIC | Age: 22
End: 2022-02-21

## 2022-02-21 RX ORDER — DEXTROAMPHETAMINE SACCHARATE, AMPHETAMINE ASPARTATE, DEXTROAMPHETAMINE SULFATE AND AMPHETAMINE SULFATE 7.5; 7.5; 7.5; 7.5 MG/1; MG/1; MG/1; MG/1
30 TABLET ORAL 2 TIMES DAILY
Qty: 60 TABLET | Refills: 0 | Status: CANCELLED | OUTPATIENT
Start: 2022-02-21 | End: 2022-03-23

## 2022-02-21 NOTE — TELEPHONE ENCOUNTER
Please see [facility] message regarding early fill on Adderall 20 mg BID thanks - to DR. CRENSHAW

## 2022-02-21 NOTE — TELEPHONE ENCOUNTER
From: Alexus Han  To: Anna Reece MD  Sent: 2/21/2022 8:09 AM CST  Subject: Prescription Refill    I am planning on going on vacation from the 6th - 13th and then I will be out of town until the 15th of March. Would it be possible to get my prescription prior to the March 10th date?

## 2022-02-21 NOTE — TELEPHONE ENCOUNTER
To MD:  The above refill request is for a controlled substance. Please review pended medication order. Print and sign for staff to fax to pharmacy or prescribe electronically. Last office visit:12/02/20  Last time refill sent and quantity/refills:2/7/22 # 60  3/10/22 # 60    Patient will be going out of town from 6th - 13th  And then he will be gone until 3/15 -he wants to get his RX prior to 3/10 - to DR. CRENSHAW

## 2022-02-21 NOTE — TELEPHONE ENCOUNTER
Yes, OK to fill the March Adderall 30 mg po BID early due to travel plans; please notify pharmacy and patient

## 2022-02-22 ENCOUNTER — TELEPHONE (OUTPATIENT)
Dept: INTERNAL MEDICINE CLINIC | Facility: CLINIC | Age: 22
End: 2022-02-22

## 2022-02-22 RX ORDER — DEXTROAMPHETAMINE SACCHARATE, AMPHETAMINE ASPARTATE, DEXTROAMPHETAMINE SULFATE AND AMPHETAMINE SULFATE 7.5; 7.5; 7.5; 7.5 MG/1; MG/1; MG/1; MG/1
30 TABLET ORAL 2 TIMES DAILY
Qty: 60 TABLET | Refills: 0 | Status: CANCELLED | OUTPATIENT
Start: 2022-02-22 | End: 2022-03-24

## 2022-02-22 NOTE — TELEPHONE ENCOUNTER
Please advise - to DR. CRENSHAW    To MD:  The above refill request is for a controlled substance. Please review pended medication order. Print and sign for staff to fax to pharmacy or prescribe electronically. Last office visit:12/2/20  Last time refill sent and quantity/refills:      Patient also got early refill for march 3/2/22 ( put as new dispense day  To DR. CRENSHAW)

## 2022-02-22 NOTE — TELEPHONE ENCOUNTER
----- Message from [employer] sent at 2/22/2022  8:11 AM CST -----  Regarding: Prescription Refill  I also forgot to mention that this March is the last prescription I have for my adderal 30mg prescribed twice daily, and I am in need a refill for the following months.

## 2022-02-24 RX ORDER — DEXTROAMPHETAMINE SACCHARATE, AMPHETAMINE ASPARTATE, DEXTROAMPHETAMINE SULFATE AND AMPHETAMINE SULFATE 7.5; 7.5; 7.5; 7.5 MG/1; MG/1; MG/1; MG/1
30 TABLET ORAL 2 TIMES DAILY
Qty: 60 TABLET | Refills: 0 | Status: SHIPPED | OUTPATIENT
Start: 2022-05-02 | End: 2022-06-01

## 2022-02-24 RX ORDER — DEXTROAMPHETAMINE SACCHARATE, AMPHETAMINE ASPARTATE, DEXTROAMPHETAMINE SULFATE AND AMPHETAMINE SULFATE 7.5; 7.5; 7.5; 7.5 MG/1; MG/1; MG/1; MG/1
30 TABLET ORAL 2 TIMES DAILY
Qty: 60 TABLET | Refills: 0 | Status: SHIPPED | OUTPATIENT
Start: 2022-06-01 | End: 2022-07-01

## 2022-02-24 RX ORDER — DEXTROAMPHETAMINE SACCHARATE, AMPHETAMINE ASPARTATE, DEXTROAMPHETAMINE SULFATE AND AMPHETAMINE SULFATE 7.5; 7.5; 7.5; 7.5 MG/1; MG/1; MG/1; MG/1
30 TABLET ORAL 2 TIMES DAILY
Qty: 60 TABLET | Refills: 0 | Status: SHIPPED | OUTPATIENT
Start: 2022-04-02 | End: 2022-05-02

## 2022-03-28 ENCOUNTER — PATIENT MESSAGE (OUTPATIENT)
Dept: INTERNAL MEDICINE CLINIC | Facility: CLINIC | Age: 22
End: 2022-03-28

## 2022-03-29 RX ORDER — DEXTROAMPHETAMINE SACCHARATE, AMPHETAMINE ASPARTATE, DEXTROAMPHETAMINE SULFATE AND AMPHETAMINE SULFATE 7.5; 7.5; 7.5; 7.5 MG/1; MG/1; MG/1; MG/1
TABLET ORAL
Qty: 60 TABLET | Refills: 0 | Status: SHIPPED | OUTPATIENT
Start: 2022-03-29

## 2022-03-29 RX ORDER — DEXTROAMPHETAMINE SACCHARATE, AMPHETAMINE ASPARTATE, DEXTROAMPHETAMINE SULFATE AND AMPHETAMINE SULFATE 7.5; 7.5; 7.5; 7.5 MG/1; MG/1; MG/1; MG/1
TABLET ORAL
Qty: 60 TABLET | Refills: 0 | Status: SHIPPED | OUTPATIENT
Start: 2022-03-29 | End: 2022-03-29

## 2022-03-29 NOTE — TELEPHONE ENCOUNTER
Spoke with [facility]. They are unable to transfer a controlled Rx to another pharmacy, even another [facility]. Asked [facility] to cancel Rx--rep states they will cancel. To Dr. Leena Rodriguez. Please re-send to [facility]. Rx pended.

## 2022-03-29 NOTE — TELEPHONE ENCOUNTER
Please advise for DR. CRENSHAW patient   There is a prescription for 4/2/22 at pharmacy # 61- to DR. LATHAM

## 2022-03-29 NOTE — TELEPHONE ENCOUNTER
From: Hever Guy  To: Sherlyn Garrett MD  Sent: 3/28/2022 7:44 AM CDT  Subject: Prescription refill    My apologies for the short notice, but I am planning to be out of town from the 30th - 6th. Would it be possible to refill my 30mg Adderal twice daily prior to the April 2nd fill date?

## 2022-03-29 NOTE — TELEPHONE ENCOUNTER
Pt also left voice mail   Rx refill was supposed to go to the pharmacy in [facility] by the end of today   Pt can be reached at [phone]

## 2022-03-31 ENCOUNTER — TELEPHONE (OUTPATIENT)
Dept: INTERNAL MEDICINE CLINIC | Facility: CLINIC | Age: 22
End: 2022-03-31

## 2022-03-31 NOTE — TELEPHONE ENCOUNTER
I spoke with patient. He is down in [facility]. 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 Patient has a sore throat. He is coughing at times on the phone. Explained that I recommended urgent care evaluation this morning. Explained that he should be evaluated and have testing ordered by person evaluating him. Explained that urgent care can prescribe medication for him. He verbalized understanding. He will find an immediate care near him. Invited him to call back with any questions or concerns. Nursing please follow up tomorrow.

## 2022-03-31 NOTE — TELEPHONE ENCOUNTER
Patient is calling he is in [facility], [facility]    Patient is experiencing a sore throat it is not getting better.     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

## 2022-04-01 NOTE — TELEPHONE ENCOUNTER
I spoke with Jay Melton. He went for evaluation yesterday. He was negative for Covid and strep. He was instructed to return for evaluation if his symptoms do not improve. Explained that I would relay her message to Dr. Mat Goins.

## 2022-04-23 ENCOUNTER — PATIENT MESSAGE (OUTPATIENT)
Dept: INTERNAL MEDICINE CLINIC | Facility: CLINIC | Age: 22
End: 2022-04-23

## 2022-04-26 ENCOUNTER — TELEPHONE (OUTPATIENT)
Dept: INTERNAL MEDICINE CLINIC | Facility: CLINIC | Age: 22
End: 2022-04-26

## 2022-04-26 NOTE — TELEPHONE ENCOUNTER
Pt moving back to [place] from [id number] [place] [address] on April 30  Due for Adderall refill & would need ok for early  at [facility] or for the doctor to transfer the prescription  to [address] [place] [address] in [facility]  (Please see my chart message pt sent on 4/23)    Please call pt to advise on refill [phone]    - pt also scheduled annual physical with   Dr Gilda Guerrero on May 5

## 2022-04-27 RX ORDER — DEXTROAMPHETAMINE SACCHARATE, AMPHETAMINE ASPARTATE, DEXTROAMPHETAMINE SULFATE AND AMPHETAMINE SULFATE 7.5; 7.5; 7.5; 7.5 MG/1; MG/1; MG/1; MG/1
30 TABLET ORAL 2 TIMES DAILY
Qty: 60 TABLET | Refills: 0 | Status: SHIPPED | OUTPATIENT
Start: 2022-04-30 | End: 2022-05-30

## 2022-05-05 ENCOUNTER — OFFICE VISIT (OUTPATIENT)
Dept: INTERNAL MEDICINE CLINIC | Facility: CLINIC | Age: 22
End: 2022-05-05
Payer: COMMERCIAL

## 2022-05-05 VITALS
HEIGHT: 68 IN | SYSTOLIC BLOOD PRESSURE: 140 MMHG | DIASTOLIC BLOOD PRESSURE: 78 MMHG | BODY MASS INDEX: 31.67 KG/M2 | TEMPERATURE: 98 F | HEART RATE: 82 BPM | WEIGHT: 209 LBS | OXYGEN SATURATION: 100 %

## 2022-05-05 DIAGNOSIS — R09.81 SINUS CONGESTION: ICD-10-CM

## 2022-05-05 DIAGNOSIS — Z00.00 PHYSICAL EXAM, ANNUAL: Primary | ICD-10-CM

## 2022-05-05 DIAGNOSIS — F98.8 ATTENTION DEFICIT DISORDER, UNSPECIFIED HYPERACTIVITY PRESENCE: ICD-10-CM

## 2022-05-05 DIAGNOSIS — J30.9 ALLERGIC RHINITIS, UNSPECIFIED SEASONALITY, UNSPECIFIED TRIGGER: ICD-10-CM

## 2022-05-05 PROCEDURE — 3008F BODY MASS INDEX DOCD: CPT | Performed by: INTERNAL MEDICINE

## 2022-05-05 PROCEDURE — 3078F DIAST BP <80 MM HG: CPT | Performed by: INTERNAL MEDICINE

## 2022-05-05 PROCEDURE — 99395 PREV VISIT EST AGE 18-39: CPT | Performed by: INTERNAL MEDICINE

## 2022-05-05 PROCEDURE — 3077F SYST BP >= 140 MM HG: CPT | Performed by: INTERNAL MEDICINE

## 2022-05-06 LAB — SARS-COV-2 RNA RESP QL NAA+PROBE: NOT DETECTED

## 2022-05-09 ENCOUNTER — TELEPHONE (OUTPATIENT)
Dept: INTERNAL MEDICINE CLINIC | Facility: CLINIC | Age: 22
End: 2022-05-09

## 2022-05-09 RX ORDER — DEXTROAMPHETAMINE SACCHARATE, AMPHETAMINE ASPARTATE, DEXTROAMPHETAMINE SULFATE AND AMPHETAMINE SULFATE 7.5; 7.5; 7.5; 7.5 MG/1; MG/1; MG/1; MG/1
30 TABLET ORAL 2 TIMES DAILY
Qty: 60 TABLET | Refills: 0 | Status: CANCELLED | OUTPATIENT
Start: 2022-07-29 | End: 2022-08-28

## 2022-05-09 RX ORDER — DEXTROAMPHETAMINE SACCHARATE, AMPHETAMINE ASPARTATE, DEXTROAMPHETAMINE SULFATE AND AMPHETAMINE SULFATE 7.5; 7.5; 7.5; 7.5 MG/1; MG/1; MG/1; MG/1
30 TABLET ORAL 2 TIMES DAILY
Qty: 60 TABLET | Refills: 0 | Status: SHIPPED | OUTPATIENT
Start: 2022-05-30 | End: 2022-06-29

## 2022-05-09 RX ORDER — DEXTROAMPHETAMINE SACCHARATE, AMPHETAMINE ASPARTATE, DEXTROAMPHETAMINE SULFATE AND AMPHETAMINE SULFATE 7.5; 7.5; 7.5; 7.5 MG/1; MG/1; MG/1; MG/1
30 TABLET ORAL 2 TIMES DAILY
Qty: 60 TABLET | Refills: 0 | Status: SHIPPED | OUTPATIENT
Start: 2022-06-29 | End: 2022-07-29

## 2022-05-09 RX ORDER — DEXTROAMPHETAMINE SACCHARATE, AMPHETAMINE ASPARTATE, DEXTROAMPHETAMINE SULFATE AND AMPHETAMINE SULFATE 7.5; 7.5; 7.5; 7.5 MG/1; MG/1; MG/1; MG/1
30 TABLET ORAL 2 TIMES DAILY
Qty: 60 TABLET | Refills: 0 | Status: CANCELLED | OUTPATIENT
Start: 2022-05-30 | End: 2022-06-29

## 2022-05-09 RX ORDER — DEXTROAMPHETAMINE SACCHARATE, AMPHETAMINE ASPARTATE, DEXTROAMPHETAMINE SULFATE AND AMPHETAMINE SULFATE 7.5; 7.5; 7.5; 7.5 MG/1; MG/1; MG/1; MG/1
30 TABLET ORAL 2 TIMES DAILY
Qty: 60 TABLET | Refills: 0 | Status: CANCELLED | OUTPATIENT
Start: 2022-06-29 | End: 2022-07-29

## 2022-05-09 RX ORDER — DEXTROAMPHETAMINE SACCHARATE, AMPHETAMINE ASPARTATE, DEXTROAMPHETAMINE SULFATE AND AMPHETAMINE SULFATE 7.5; 7.5; 7.5; 7.5 MG/1; MG/1; MG/1; MG/1
30 TABLET ORAL 2 TIMES DAILY
Qty: 60 TABLET | Refills: 0 | Status: SHIPPED | OUTPATIENT
Start: 2022-07-29 | End: 2022-08-28

## 2022-05-09 NOTE — TELEPHONE ENCOUNTER
Pt called, requesting refill for:    Adderall  Pt is low on medication    Pt uses [facility]    Tasked to [employer]

## 2022-05-09 NOTE — TELEPHONE ENCOUNTER
To Dr. Tom Held:  Pt concerned because his Adderall order is not appearing on his [employer]. 30 day supply sent 4/27, but discontinued at 5/5 office visit. Was this an error? Next fill date 5/30/22.

## 2022-05-10 NOTE — TELEPHONE ENCOUNTER
ERx sent for Adderall 30 mg po BID #60 to [facility] in [place] at [facility] for pt

## 2022-05-16 ENCOUNTER — TELEPHONE (OUTPATIENT)
Dept: INTERNAL MEDICINE CLINIC | Facility: CLINIC | Age: 22
End: 2022-05-16

## 2022-05-16 RX ORDER — METHYLPHENIDATE HYDROCHLORIDE 36 MG/1
36 TABLET ORAL DAILY
Qty: 30 TABLET | Refills: 0 | Status: SHIPPED | OUTPATIENT
Start: 2022-05-30 | End: 2022-06-29

## 2022-05-16 NOTE — TELEPHONE ENCOUNTER
Received voice mail message from patient   Asks for call back from Dr Avery Malik to discuss taking a lower amount/extended release of Adderall   Feels he is taking a bit too much     Call back [phone]

## 2022-05-17 NOTE — TELEPHONE ENCOUNTER
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  Starting 5/30/22    ERx sent

## 2022-05-19 NOTE — TELEPHONE ENCOUNTER
Spoke with Margot Amos at [facility] who will put note in RX OK to fill early due to travel plans. Insurance will not cover refill until March 2 so they will ok the refill around that time. My chart update sent to patient. Yes

## 2022-06-21 ENCOUNTER — PATIENT MESSAGE (OUTPATIENT)
Dept: INTERNAL MEDICINE CLINIC | Facility: CLINIC | Age: 22
End: 2022-06-21

## 2022-06-21 ENCOUNTER — TELEPHONE (OUTPATIENT)
Dept: INTERNAL MEDICINE CLINIC | Facility: CLINIC | Age: 22
End: 2022-06-21

## 2022-06-22 RX ORDER — METHYLPHENIDATE HYDROCHLORIDE 36 MG/1
36 TABLET ORAL DAILY
Qty: 30 TABLET | Refills: 0 | Status: SHIPPED | OUTPATIENT
Start: 2022-06-29 | End: 2022-07-29

## 2022-06-22 RX ORDER — METHYLPHENIDATE HYDROCHLORIDE 36 MG/1
36 TABLET ORAL DAILY
Qty: 30 TABLET | Refills: 0 | Status: SHIPPED | OUTPATIENT
Start: 2022-07-29 | End: 2022-08-28

## 2022-06-22 RX ORDER — METHYLPHENIDATE HYDROCHLORIDE 36 MG/1
36 TABLET ORAL DAILY
Qty: 30 TABLET | Refills: 0 | Status: SHIPPED | OUTPATIENT
Start: 2022-08-28 | End: 2022-09-27

## 2022-07-17 ENCOUNTER — PATIENT MESSAGE (OUTPATIENT)
Dept: INTERNAL MEDICINE CLINIC | Facility: CLINIC | Age: 22
End: 2022-07-17

## 2022-07-18 ENCOUNTER — TELEPHONE (OUTPATIENT)
Dept: INTERNAL MEDICINE CLINIC | Facility: CLINIC | Age: 22
End: 2022-07-18

## 2022-07-18 NOTE — TELEPHONE ENCOUNTER
He has had seasonal allergies for many years. He takes an over the counter allergy pill. He is currently taking a medication that starts with an L, he cannot recall the name. He had been taking cetirizine prior to that but felt it stopped working. Feels symptoms are worse over the past few months. Recently has sneezing fits three or four times a day. No nasal sprays. He asks about medication recommendations. To Dr. Cyndie White, please advise.

## 2022-07-18 NOTE — TELEPHONE ENCOUNTER
Telephone encounter created to triage patient's current symptoms. Routed to Dr. Mat Goins for review.

## 2022-07-18 NOTE — TELEPHONE ENCOUNTER
Has failed Zyzal 5 mg po daily    Imp- allergic rhinitis    Rec- may be a candidate for montelukast 10 mg po at bedtime #30, 5 RF    Patient not available; LMVM; LMTCB; if he calls back, may send ERx above

## 2022-07-22 RX ORDER — MONTELUKAST SODIUM 10 MG/1
10 TABLET ORAL NIGHTLY
Qty: 30 TABLET | Refills: 5 | Status: SHIPPED | OUTPATIENT
Start: 2022-07-22 | End: 2023-01-05

## 2022-07-22 NOTE — TELEPHONE ENCOUNTER
I spoke with patient and relayed Dr. Rommel Jeff message. He verbalized understanding. Patient is agreeable to try montelukast. Prescription sent per Dr. Rommel Jeff order to [address] [place] [address]. He also asks if he needs a different ADHD medication because he is having some impulse issues with food. He has problems with compulsive eating and feels this has gotten worse since switching from Adderall to Concerta. He asks if there is something \"in the middle\" of these two medications that he could try. Explained that Dr. Becca Alvarado is out of the office and will be back August 1. Patient is agreeable to wait for Dr. Becca Alvarado. I did offer to send this to a doctor on call and patient says he prefers to wait for Dr. Becca Alvarado. Invited patient to call back with any questions or concerns.

## 2022-07-26 ENCOUNTER — PATIENT MESSAGE (OUTPATIENT)
Dept: INTERNAL MEDICINE CLINIC | Facility: CLINIC | Age: 22
End: 2022-07-26

## 2022-07-27 ENCOUNTER — TELEPHONE (OUTPATIENT)
Dept: INTERNAL MEDICINE CLINIC | Facility: CLINIC | Age: 22
End: 2022-07-27

## 2022-07-29 ENCOUNTER — TELEPHONE (OUTPATIENT)
Dept: INTERNAL MEDICINE CLINIC | Facility: CLINIC | Age: 22
End: 2022-07-29

## 2022-07-29 ENCOUNTER — PATIENT MESSAGE (OUTPATIENT)
Dept: INTERNAL MEDICINE CLINIC | Facility: CLINIC | Age: 22
End: 2022-07-29

## 2022-07-29 DIAGNOSIS — H53.8 BLURRY VISION: Primary | ICD-10-CM

## 2022-07-29 NOTE — TELEPHONE ENCOUNTER
----- Message from Sarahi Eagle sent at 7/29/2022  2:18 PM CDT -----  Regarding: Eye Doctor Request  I would like to request a referral for an Eye Doctor to get an eye exam and a new pair of glasses.

## 2022-07-29 NOTE — TELEPHONE ENCOUNTER
From: Quinton Lester  To: Cristobal Sanchez MD  Sent: 7/29/2022 2:18 PM CDT  Subject: Eye Doctor Request    I would like to request a referral for an Eye Doctor to get an eye exam and a new pair of glasses.

## 2022-08-01 ENCOUNTER — PATIENT MESSAGE (OUTPATIENT)
Dept: INTERNAL MEDICINE CLINIC | Facility: CLINIC | Age: 22
End: 2022-08-01

## 2022-08-01 ENCOUNTER — TELEPHONE (OUTPATIENT)
Dept: INTERNAL MEDICINE CLINIC | Facility: CLINIC | Age: 22
End: 2022-08-01

## 2022-08-01 DIAGNOSIS — H53.8 BLURRY VISION: Primary | ICD-10-CM

## 2022-08-01 NOTE — TELEPHONE ENCOUNTER
Laci French,     Referral generated for ophthalmologist, Dr Kunal Gilliland    [address], [place] [id number] [phone]    Dr Clark Bonds

## 2022-08-01 NOTE — TELEPHONE ENCOUNTER
Rhenda Essex,    Referral has been generated for optometrist, Dr Kem Diana  Address: [address] [place] [address] [address], [place], [place]  Phone: [phone]    Dr Linus Patel

## 2022-08-01 NOTE — TELEPHONE ENCOUNTER
----- Message from Jarrell Larry sent at 8/1/2022  9:58 AM CDT -----  Regarding: Eye Doctor Request  I just called and I was informed that that doctor no longer practices there. Is there another doctor I can get a referral for?

## 2022-08-02 ENCOUNTER — TELEPHONE (OUTPATIENT)
Dept: INTERNAL MEDICINE CLINIC | Facility: CLINIC | Age: 22
End: 2022-08-02

## 2022-08-02 ENCOUNTER — PATIENT MESSAGE (OUTPATIENT)
Dept: INTERNAL MEDICINE CLINIC | Facility: CLINIC | Age: 22
End: 2022-08-02

## 2022-08-02 DIAGNOSIS — R06.83 SNORING: Primary | ICD-10-CM

## 2022-08-02 NOTE — TELEPHONE ENCOUNTER
----- Message from River Llamas sent at 8/2/2022  3:50 PM CDT -----  Regarding: [facility] Medication Change   Could I be contacted regarding this issue? Additionally, I have an issue snoring at night and I would like to discuss possible ways to stop or reduce my snoring.

## 2022-08-11 RX ORDER — DEXTROAMPHETAMINE SACCHARATE, AMPHETAMINE ASPARTATE MONOHYDRATE, DEXTROAMPHETAMINE SULFATE AND AMPHETAMINE SULFATE 7.5; 7.5; 7.5; 7.5 MG/1; MG/1; MG/1; MG/1
30 CAPSULE, EXTENDED RELEASE ORAL DAILY
Qty: 30 CAPSULE | Refills: 0 | Status: SHIPPED | OUTPATIENT
Start: 2022-09-27 | End: 2022-10-27

## 2022-08-11 RX ORDER — DEXTROAMPHETAMINE SACCHARATE, AMPHETAMINE ASPARTATE MONOHYDRATE, DEXTROAMPHETAMINE SULFATE AND AMPHETAMINE SULFATE 7.5; 7.5; 7.5; 7.5 MG/1; MG/1; MG/1; MG/1
30 CAPSULE, EXTENDED RELEASE ORAL DAILY
Qty: 30 CAPSULE | Refills: 0 | Status: SHIPPED | OUTPATIENT
Start: 2022-08-28 | End: 2022-09-27

## 2022-08-11 RX ORDER — DEXTROAMPHETAMINE SACCHARATE, AMPHETAMINE ASPARTATE MONOHYDRATE, DEXTROAMPHETAMINE SULFATE AND AMPHETAMINE SULFATE 7.5; 7.5; 7.5; 7.5 MG/1; MG/1; MG/1; MG/1
30 CAPSULE, EXTENDED RELEASE ORAL DAILY
Qty: 30 CAPSULE | Refills: 0 | Status: SHIPPED | OUTPATIENT
Start: 2022-10-27 | End: 2022-11-26

## 2022-08-11 NOTE — TELEPHONE ENCOUNTER
Imp- snoring  Pr referred to ENT Dr Eleanor Dougherty    Imp ADHD  Rec- stop Concerta 36    Start Adderall XR 30 mg po every day #30, 2 RF    Pt called

## 2022-08-15 ENCOUNTER — PATIENT MESSAGE (OUTPATIENT)
Dept: INTERNAL MEDICINE CLINIC | Facility: CLINIC | Age: 22
End: 2022-08-15

## 2022-08-16 NOTE — TELEPHONE ENCOUNTER
From: Quinton Lester  To: Cristobal Sanchez MD  Sent: 8/15/2022 10:09 PM CDT  Subject: Montelukast not effective     I was prescribed a new allergy medicine earlier this month, montelukast, and it has not improved my allergies. I feel that my over the counter medicine was more effective at treating it. Would it be safe for me to stop taking this and go back to the over the counter?  Or perhaps something different

## 2022-08-16 NOTE — TELEPHONE ENCOUNTER
To Dr. Rabia Luis---    [facility]  \"I was prescribed a new allergy medicine earlier this month, montelukast, and it has not improved my allergies. I feel that my over the counter medicine was more effective at treating it. Would it be safe for me to stop taking this and go back to the over the counter?  Or perhaps something different \"

## 2022-08-26 ENCOUNTER — OFFICE VISIT (OUTPATIENT)
Dept: OTOLARYNGOLOGY | Facility: CLINIC | Age: 22
End: 2022-08-26
Payer: COMMERCIAL

## 2022-08-26 VITALS — BODY MASS INDEX: 31.67 KG/M2 | HEIGHT: 68 IN | TEMPERATURE: 99 F | WEIGHT: 209 LBS

## 2022-08-26 DIAGNOSIS — J34.2 DEVIATED SEPTUM: ICD-10-CM

## 2022-08-26 DIAGNOSIS — R09.81 CHRONIC NASAL CONGESTION: ICD-10-CM

## 2022-08-26 DIAGNOSIS — J35.1 TONSILLAR HYPERTROPHY: ICD-10-CM

## 2022-08-26 DIAGNOSIS — R06.83 SNORING: Primary | ICD-10-CM

## 2022-08-26 PROCEDURE — 3008F BODY MASS INDEX DOCD: CPT | Performed by: STUDENT IN AN ORGANIZED HEALTH CARE EDUCATION/TRAINING PROGRAM

## 2022-08-26 PROCEDURE — 99204 OFFICE O/P NEW MOD 45 MIN: CPT | Performed by: STUDENT IN AN ORGANIZED HEALTH CARE EDUCATION/TRAINING PROGRAM

## 2022-08-26 RX ORDER — AZELASTINE 1 MG/ML
2 SPRAY, METERED NASAL 2 TIMES DAILY
Qty: 30 ML | Refills: 0 | Status: SHIPPED | OUTPATIENT
Start: 2022-08-26

## 2022-08-26 RX ORDER — LORATADINE AND PSEUDOEPHEDRINE 10; 240 MG/1; MG/1
1 TABLET, EXTENDED RELEASE ORAL DAILY
Qty: 30 TABLET | Refills: 1 | Status: SHIPPED | OUTPATIENT
Start: 2022-08-26

## 2022-08-26 RX ORDER — FLUTICASONE PROPIONATE 50 MCG
2 SPRAY, SUSPENSION (ML) NASAL 2 TIMES DAILY
Qty: 16 G | Refills: 3 | Status: SHIPPED | OUTPATIENT
Start: 2022-08-26

## 2022-09-16 ENCOUNTER — OFFICE VISIT (OUTPATIENT)
Dept: OTOLARYNGOLOGY | Facility: CLINIC | Age: 22
End: 2022-09-16
Payer: COMMERCIAL

## 2022-09-16 VITALS — WEIGHT: 209 LBS | HEIGHT: 68 IN | BODY MASS INDEX: 31.67 KG/M2

## 2022-09-16 DIAGNOSIS — R06.83 SNORING: ICD-10-CM

## 2022-09-16 DIAGNOSIS — J30.2 SEASONAL ALLERGIC RHINITIS, UNSPECIFIED TRIGGER: ICD-10-CM

## 2022-09-16 DIAGNOSIS — J34.3 HYPERTROPHY OF INFERIOR NASAL TURBINATE: ICD-10-CM

## 2022-09-16 DIAGNOSIS — J34.2 DEVIATED SEPTUM: ICD-10-CM

## 2022-09-16 DIAGNOSIS — J35.1 TONSILLAR HYPERTROPHY: Primary | ICD-10-CM

## 2022-09-16 PROCEDURE — 3008F BODY MASS INDEX DOCD: CPT | Performed by: STUDENT IN AN ORGANIZED HEALTH CARE EDUCATION/TRAINING PROGRAM

## 2022-09-16 PROCEDURE — 99214 OFFICE O/P EST MOD 30 MIN: CPT | Performed by: STUDENT IN AN ORGANIZED HEALTH CARE EDUCATION/TRAINING PROGRAM

## 2022-09-19 ENCOUNTER — TELEPHONE (OUTPATIENT)
Dept: INTERNAL MEDICINE CLINIC | Facility: CLINIC | Age: 22
End: 2022-09-19

## 2022-09-19 DIAGNOSIS — J34.3 HYPERTROPHY OF INFERIOR NASAL TURBINATE: ICD-10-CM

## 2022-09-19 DIAGNOSIS — R06.83 SNORING: ICD-10-CM

## 2022-09-19 DIAGNOSIS — J35.1 TONSILLAR HYPERTROPHY: Primary | ICD-10-CM

## 2022-09-19 RX ORDER — FLUTICASONE PROPIONATE 50 MCG
2 SPRAY, SUSPENSION (ML) NASAL 2 TIMES DAILY
Qty: 16 G | Refills: 3 | Status: CANCELLED | OUTPATIENT
Start: 2022-09-19

## 2022-09-19 RX ORDER — LORATADINE AND PSEUDOEPHEDRINE 10; 240 MG/1; MG/1
1 TABLET, EXTENDED RELEASE ORAL DAILY
Qty: 30 TABLET | Refills: 1 | Status: CANCELLED | OUTPATIENT
Start: 2022-09-19

## 2022-09-19 NOTE — TELEPHONE ENCOUNTER
[facility], patient would like call if referral is truly authorized for Dr Rupert Garcia for his surgery. Thank you.

## 2022-09-19 NOTE — TELEPHONE ENCOUNTER
Patient is calling to request a referral for Dr Christopher Valverde, ENT. Dr Robi Bailey  [address] [place] [address] [phone]    Patient has seen doctor twice already. Patient currently has a surgery date at the end of September assuming the referral is generated and authorized with insurance.       # [phone]

## 2022-09-19 NOTE — TELEPHONE ENCOUNTER
Please advise on pending referral , also does he need to be seen for pre -op clearance ? To DR. CRENSHAW

## 2022-09-20 NOTE — TELEPHONE ENCOUNTER
Good Afternoon Dr Kebede See and staff    REf# [id number]  - this referral is authorized for 3 office visits only for Dr Yuan Flannery  - this referral is not for surgery     If patient is having a surgery with ENT, Dr Bren Scales then their office would pend the referral to [facility]. Please advise pateint to reach out to [facility] at ENT office regarding prior auth at phone# [phone].     Thank you    [facility]

## 2022-09-21 ENCOUNTER — OFFICE VISIT (OUTPATIENT)
Dept: OPHTHALMOLOGY | Facility: CLINIC | Age: 22
End: 2022-09-21

## 2022-09-21 DIAGNOSIS — H02.883 MEIBOMIAN GLAND DYSFUNCTION (MGD) OF BOTH EYES: ICD-10-CM

## 2022-09-21 DIAGNOSIS — H52.203 MYOPIA OF BOTH EYES WITH ASTIGMATISM: Primary | ICD-10-CM

## 2022-09-21 DIAGNOSIS — H02.886 MEIBOMIAN GLAND DYSFUNCTION (MGD) OF BOTH EYES: ICD-10-CM

## 2022-09-21 DIAGNOSIS — H52.13 MYOPIA OF BOTH EYES WITH ASTIGMATISM: Primary | ICD-10-CM

## 2022-09-21 PROCEDURE — 92004 COMPRE OPH EXAM NEW PT 1/>: CPT | Performed by: OPHTHALMOLOGY

## 2022-09-21 PROCEDURE — 92015 DETERMINE REFRACTIVE STATE: CPT | Performed by: OPHTHALMOLOGY

## 2022-09-21 RX ORDER — AZELASTINE 1 MG/ML
2 SPRAY, METERED NASAL 2 TIMES DAILY
Qty: 30 ML | Refills: 0 | Status: SHIPPED | OUTPATIENT
Start: 2022-09-21

## 2022-09-21 NOTE — TELEPHONE ENCOUNTER
LOV 9/16/22. Rx for azelastine sent, but other 2 Rxs were sent with refills 1 month ago. EBIQUOUSt sent to patient. anxiety/depression

## 2022-09-21 NOTE — PATIENT INSTRUCTIONS
Myopia of both eyes with astigmatism  New glasses Rx given today, update as needed    Meibomian gland dysfunction (MGD) of both eyes  No treatment

## 2022-09-22 DIAGNOSIS — J34.3 HYPERTROPHY OF NASAL TURBINATES: ICD-10-CM

## 2022-09-22 DIAGNOSIS — J34.2 DEVIATED NASAL SEPTUM: Primary | ICD-10-CM

## 2022-09-22 DIAGNOSIS — J35.1 HYPERTROPHY OF TONSILS ALONE: ICD-10-CM

## 2022-09-22 NOTE — TELEPHONE ENCOUNTER
Spoke to pt and relayed 's message below. It appears that referral for surgery has been placed today 9/22 by Dr. Cathlene Litten office. Provided with 's patient line phone # and advised to contact Dr. Cathlene Litten office per Denise's message. Pt verbalized understanding.

## 2022-09-25 ENCOUNTER — LAB ENCOUNTER (OUTPATIENT)
Dept: LAB | Facility: HOSPITAL | Age: 22
End: 2022-09-25
Attending: STUDENT IN AN ORGANIZED HEALTH CARE EDUCATION/TRAINING PROGRAM

## 2022-09-25 DIAGNOSIS — Z01.818 PREOP TESTING: ICD-10-CM

## 2022-09-25 LAB — SARS-COV-2 RNA RESP QL NAA+PROBE: NOT DETECTED

## 2022-09-26 ENCOUNTER — TELEPHONE (OUTPATIENT)
Dept: INTERNAL MEDICINE CLINIC | Facility: CLINIC | Age: 22
End: 2022-09-26

## 2022-09-26 DIAGNOSIS — J35.1 TONSILLAR HYPERTROPHY: Primary | ICD-10-CM

## 2022-09-27 ENCOUNTER — LAB ENCOUNTER (OUTPATIENT)
Dept: LAB | Facility: HOSPITAL | Age: 22
End: 2022-09-27
Attending: STUDENT IN AN ORGANIZED HEALTH CARE EDUCATION/TRAINING PROGRAM
Payer: COMMERCIAL

## 2022-09-27 DIAGNOSIS — Z01.818 PREOP TESTING: ICD-10-CM

## 2022-09-27 LAB — SARS-COV-2 RNA RESP QL NAA+PROBE: NOT DETECTED

## 2022-09-28 ENCOUNTER — TELEPHONE (OUTPATIENT)
Dept: OTOLARYNGOLOGY | Facility: CLINIC | Age: 22
End: 2022-09-28

## 2022-09-28 DIAGNOSIS — J34.2 DEVIATED NASAL SEPTUM: Primary | ICD-10-CM

## 2022-09-28 NOTE — TELEPHONE ENCOUNTER
[facility] is requesting additional information. Please see below and advise. Per  Nurse: Carmen Gold reviewed, has patient had a sleep study? If so, need results please. If not or sleep study is no feasible, please advise as to why. \"

## 2022-09-28 NOTE — TELEPHONE ENCOUNTER
Lashonda Saucedo MD  You 33 minutes ago (10:25 AM)         No sleep study. For snoring if there are obvious anatomic obstructions such as big tonsils, we usually go ahead and take the tonsils out up front before a sleep study.      Message text

## 2022-09-30 NOTE — TELEPHONE ENCOUNTER
[place] [facility] calling to discuss clinical procedure states not making sense states he needs to know why pt need this procedure please advise

## 2022-09-30 NOTE — TELEPHONE ENCOUNTER
Per shanthi from [place] [address], uvelectomy still not does not meet criteria and peer to peer to be done. Dr. Linda Gusman will contact Dr. Danni Zhu at [phone] on 10/02 at 8 am. Dr. Linda Gusman given information.

## 2022-10-04 ENCOUNTER — TELEPHONE (OUTPATIENT)
Dept: OTOLARYNGOLOGY | Facility: CLINIC | Age: 22
End: 2022-10-04

## 2022-10-04 NOTE — TELEPHONE ENCOUNTER
Informed patient peer to peer was done on 10/3/22, per Dr. Robert Zhao would like to proceed with the [facility] and Tonsillectomy. Patient will call back to schedule procedure.

## 2022-10-04 NOTE — TELEPHONE ENCOUNTER
LOV 9/16/22  No follow up scheduled. 1 month supply Rx was sent with a refill on 8/26/22. Pharmacy should have refill.

## 2022-10-07 ENCOUNTER — TELEPHONE (OUTPATIENT)
Dept: OTOLARYNGOLOGY | Facility: CLINIC | Age: 22
End: 2022-10-07

## 2022-10-07 NOTE — TELEPHONE ENCOUNTER
Per Dr. Sherita Kawasaki peer to peer was completed on 10/3/22 which lead to a denial for Uvulectomy. Per Cele at [facility] patient would need a sleep test prior to surgery. Patient informed of results. Per pt will call back to schedule, he would like to contact his insurance.

## 2022-10-10 ENCOUNTER — TELEPHONE (OUTPATIENT)
Dept: OTOLARYNGOLOGY | Facility: CLINIC | Age: 22
End: 2022-10-10

## 2022-10-10 DIAGNOSIS — J35.1 HYPERTROPHY OF TONSILS ALONE: ICD-10-CM

## 2022-10-10 DIAGNOSIS — J34.3 HYPERTROPHY OF NASAL TURBINATES: Primary | ICD-10-CM

## 2022-10-17 ENCOUNTER — PATIENT MESSAGE (OUTPATIENT)
Dept: INTERNAL MEDICINE CLINIC | Facility: CLINIC | Age: 22
End: 2022-10-17

## 2022-10-17 NOTE — TELEPHONE ENCOUNTER
MyChart sent to patient. position/activity restrictions:  knee aspirated 4-8-19. Vision/Hearing  Vision: Within Functional Limits  Hearing: Within functional limits     Subjective  General  Chart Reviewed: Yes  Additional Pertinent Hx: Patient to hospital 4-6-19 with reports of chest pain and SOB. \"In the ED his ECG showed nonspecific ST changes; the Twave inversions in lateral leads were present on previous ECGs. His initial troponins were negative. His BNP was elevated. CT chest and CXR were nonacute. \"  He also reported pain and swelling in L knee and R hand. On 4-8-19 he underwent L knee aspiration. PMHx as noted above. Response To Previous Treatment: Not applicable  Family / Caregiver Present: No  Referring Practitioner: Nighat Crabtree  Referral Date : 04/08/19  Subjective  Subjective: Agreeable to therapy. Rates L knee pain 2-3/10. Reports notable improvement in pain since ealier knee aspiration. Orientation  Orientation  Overall Orientation Status: Within Normal Limits  Social/Functional History  Social/Functional History  Lives With: Friend(s)(sister. Patient reports a recently ended relationship, and possibly recent move to his sister's home.)  Type of Home: House  Home Layout: Two level, Able to Live on Main level with bedroom/bathroom, Laundry in basement  Home Access: Stairs to enter with rails  Entrance Stairs - Number of Steps: 4  Entrance Stairs - Rails: Left  Bathroom Shower/Tub: Tub/Shower unit  Bathroom Toilet: Standard(inbetween vanity and tub)  Home Equipment: Crutches(1 crutch)  ADL Assistance: Independent  Homemaking Assistance: Independent  Ambulation Assistance: Independent  Transfer Assistance: Independent  Active : No  Type of occupation: last worked Nov 2018,   Leisure & Hobbies: TV    Objective  AROM RLE (degrees)  RLE AROM: WNL  AROM LLE (degrees)  LLE AROM : WNL  LLE General AROM: knee ROM grossly 5-100.   At entry to room patient noted to have L knee flexed to at least 30 degrees over pillows. Patient instructed to position knee in full extension, with support of pillows placed long ways under entire L lower leg. Strength RLE  Strength RLE: WNL  Strength LLE  Strength LLE: WFL  Comment: Trace less than R LE  Sensation  Overall Sensation Status: WNL  Bed mobility  Supine to Sit: Independent  Sit to Supine: Unable to assess(left in BS chair at end of session)  Transfers  Sit to Stand: Supervision  Stand to sit: Supervision  Ambulation  Ambulation?: Yes  Ambulation 1  Surface: level tile  Device: No Device  Quality of Gait: Step through pattern with trace decreased L hip/knee flexion. Good floor clearance B. No loss of balance. Distance: 48'  Comments: Likely not maximum distance; therapist limits ambulation distance due to aspiration of knee this date.    Stairs/Curb  Stairs?: No(Patient verbally instructed in stair method for home entry if knee is painful. )  Balance  Sitting - Static: Good;+  Sitting - Dynamic: Good;+  Standing - Static: Good;+  Standing - Dynamic: Good;+    Plan   Safety Devices  Type of devices: Call light within reach, Gait belt, Nurse notified(MARCIANO Pollock Breath notified)    AM-PAC Score  AM-PAC Inpatient Mobility Raw Score : 24  AM-PAC Inpatient T-Scale Score : 61.14  Mobility Inpatient CMS 0-100% Score: 0  Mobility Inpatient CMS G-Code Modifier : [facility]     Goals  Patient Goals   Patient goals : none stated       Therapy Time   Individual Concurrent Group Co-treatment   Time In 1610         Time Out 0274         Minutes 35            Sagar Ratliff PT  Electronically signed by Cindi Rudolph PT [id number] on 4/8/2019 at 4:59 PM

## 2022-10-17 NOTE — TELEPHONE ENCOUNTER
Per pt the dosage for both nasal sprays are only lasting 15 days per direction. Per pt does not know if it is an insurance issue and asking if he can get prescriptions to last longer than 15 days. Per pt Claritin d has been on back stock at [facility] for over a month and asking if there is a substitution.  Please advise

## 2022-10-17 NOTE — TELEPHONE ENCOUNTER
From: Darrell Abarca  To: Pedro Boswell MD  Sent: 10/17/2022 4:29 PM CDT  Subject: Prescription Not on Darrell Carroll,    I was in the process of getting my medications refilled for my allergy medication when I noticed I do not currently have any listed medications for the adderal I am prescribed. This includes the previous prescription I picked up on 9/27 (it usually shows what I am currently taking on MyChart). Regardless, I will be needing a refill on 10/27 for my current dosage.      Thanks,     Cephas Galeazzi

## 2022-10-18 RX ORDER — AZELASTINE HYDROCHLORIDE 137 UG/1
SPRAY, METERED NASAL
Qty: 30 ML | Refills: 2 | Status: SHIPPED | OUTPATIENT
Start: 2022-10-18

## 2022-10-23 ENCOUNTER — PATIENT MESSAGE (OUTPATIENT)
Dept: INTERNAL MEDICINE CLINIC | Facility: CLINIC | Age: 22
End: 2022-10-23

## 2022-10-24 ENCOUNTER — PATIENT MESSAGE (OUTPATIENT)
Dept: INTERNAL MEDICINE CLINIC | Facility: CLINIC | Age: 22
End: 2022-10-24

## 2022-10-24 RX ORDER — LORATADINE 10 MG/1
10 TABLET ORAL NIGHTLY
COMMUNITY

## 2022-10-24 NOTE — TELEPHONE ENCOUNTER
Per med rec, patient should already have an rx dated 10/27, called [facility] and confirmed. My chart sent to patient.

## 2022-10-24 NOTE — TELEPHONE ENCOUNTER
From: Ilir Carpio  Sent: 10/23/2022 12:29 PM CDT  To: Laury Johnson [facility] Clinical Staff  Subject: Prescription Not on MyChart    I would like to request a refill for my adderal which is scheduled 10/27

## 2022-10-25 ENCOUNTER — LAB ENCOUNTER (OUTPATIENT)
Dept: LAB | Facility: HOSPITAL | Age: 22
End: 2022-10-25
Attending: STUDENT IN AN ORGANIZED HEALTH CARE EDUCATION/TRAINING PROGRAM
Payer: COMMERCIAL

## 2022-10-25 DIAGNOSIS — Z01.818 PREOP TESTING: ICD-10-CM

## 2022-10-25 LAB — SARS-COV-2 RNA RESP QL NAA+PROBE: NOT DETECTED

## 2022-10-26 ENCOUNTER — HOSPITAL ENCOUNTER (OUTPATIENT)
Facility: HOSPITAL | Age: 22
Setting detail: HOSPITAL OUTPATIENT SURGERY
Discharge: HOME | End: 2022-10-26
Attending: STUDENT IN AN ORGANIZED HEALTH CARE EDUCATION/TRAINING PROGRAM | Admitting: STUDENT IN AN ORGANIZED HEALTH CARE EDUCATION/TRAINING PROGRAM
Payer: COMMERCIAL

## 2022-10-26 ENCOUNTER — ANESTHESIA EVENT (OUTPATIENT)
Dept: SURGERY | Facility: HOSPITAL | Age: 22
End: 2022-10-26
Payer: COMMERCIAL

## 2022-10-26 ENCOUNTER — ANESTHESIA (OUTPATIENT)
Dept: SURGERY | Facility: HOSPITAL | Age: 22
End: 2022-10-26
Payer: COMMERCIAL

## 2022-10-26 VITALS
HEIGHT: 67 IN | HEART RATE: 74 BPM | RESPIRATION RATE: 12 BRPM | OXYGEN SATURATION: 96 % | WEIGHT: 230 LBS | SYSTOLIC BLOOD PRESSURE: 149 MMHG | TEMPERATURE: 98 F | DIASTOLIC BLOOD PRESSURE: 71 MMHG | BODY MASS INDEX: 36.1 KG/M2

## 2022-10-26 DIAGNOSIS — J34.3 HYPERTROPHY OF NASAL TURBINATES: ICD-10-CM

## 2022-10-26 DIAGNOSIS — J35.1 HYPERTROPHY OF TONSILS ALONE: ICD-10-CM

## 2022-10-26 DIAGNOSIS — Z01.818 PREOP TESTING: Primary | ICD-10-CM

## 2022-10-26 PROCEDURE — 42826 REMOVAL OF TONSILS: CPT | Performed by: STUDENT IN AN ORGANIZED HEALTH CARE EDUCATION/TRAINING PROGRAM

## 2022-10-26 PROCEDURE — 09SL7ZZ REPOSITION NASAL TURBINATE, VIA NATURAL OR ARTIFICIAL OPENING: ICD-10-PCS | Performed by: STUDENT IN AN ORGANIZED HEALTH CARE EDUCATION/TRAINING PROGRAM

## 2022-10-26 PROCEDURE — 095L7ZZ DESTRUCTION OF NASAL TURBINATE, VIA NATURAL OR ARTIFICIAL OPENING: ICD-10-PCS | Performed by: STUDENT IN AN ORGANIZED HEALTH CARE EDUCATION/TRAINING PROGRAM

## 2022-10-26 PROCEDURE — 0CTPXZZ RESECTION OF TONSILS, EXTERNAL APPROACH: ICD-10-PCS | Performed by: STUDENT IN AN ORGANIZED HEALTH CARE EDUCATION/TRAINING PROGRAM

## 2022-10-26 PROCEDURE — 30802 ABLATE INF TURBINATE SUBMUC: CPT | Performed by: STUDENT IN AN ORGANIZED HEALTH CARE EDUCATION/TRAINING PROGRAM

## 2022-10-26 RX ORDER — ONDANSETRON 2 MG/ML
4 INJECTION INTRAMUSCULAR; INTRAVENOUS EVERY 6 HOURS PRN
Status: DISCONTINUED | OUTPATIENT
Start: 2022-10-26 | End: 2022-10-26

## 2022-10-26 RX ORDER — SODIUM CHLORIDE, SODIUM LACTATE, POTASSIUM CHLORIDE, CALCIUM CHLORIDE 600; 310; 30; 20 MG/100ML; MG/100ML; MG/100ML; MG/100ML
INJECTION, SOLUTION INTRAVENOUS CONTINUOUS
Status: DISCONTINUED | OUTPATIENT
Start: 2022-10-26 | End: 2022-10-26

## 2022-10-26 RX ORDER — CEFAZOLIN SODIUM/WATER 2 G/20 ML
SYRINGE (ML) INTRAVENOUS AS NEEDED
Status: DISCONTINUED | OUTPATIENT
Start: 2022-10-26 | End: 2022-10-26 | Stop reason: SURG

## 2022-10-26 RX ORDER — HYDROMORPHONE HYDROCHLORIDE 1 MG/ML
0.4 INJECTION, SOLUTION INTRAMUSCULAR; INTRAVENOUS; SUBCUTANEOUS EVERY 5 MIN PRN
Status: DISCONTINUED | OUTPATIENT
Start: 2022-10-26 | End: 2022-10-26

## 2022-10-26 RX ORDER — GLYCOPYRROLATE 0.2 MG/ML
INJECTION, SOLUTION INTRAMUSCULAR; INTRAVENOUS AS NEEDED
Status: DISCONTINUED | OUTPATIENT
Start: 2022-10-26 | End: 2022-10-26 | Stop reason: SURG

## 2022-10-26 RX ORDER — PROCHLORPERAZINE EDISYLATE 5 MG/ML
5 INJECTION INTRAMUSCULAR; INTRAVENOUS EVERY 8 HOURS PRN
Status: DISCONTINUED | OUTPATIENT
Start: 2022-10-26 | End: 2022-10-26

## 2022-10-26 RX ORDER — HYDROMORPHONE HYDROCHLORIDE 1 MG/ML
0.6 INJECTION, SOLUTION INTRAMUSCULAR; INTRAVENOUS; SUBCUTANEOUS EVERY 5 MIN PRN
Status: DISCONTINUED | OUTPATIENT
Start: 2022-10-26 | End: 2022-10-26

## 2022-10-26 RX ORDER — LIDOCAINE HYDROCHLORIDE 20 MG/ML
5 SOLUTION OROPHARYNGEAL EVERY 4 HOURS PRN
Qty: 75 ML | Refills: 0 | Status: SHIPPED | OUTPATIENT
Start: 2022-10-26 | End: 2022-10-29

## 2022-10-26 RX ORDER — HYDROCODONE BITARTRATE AND ACETAMINOPHEN 5; 325 MG/1; MG/1
1 TABLET ORAL EVERY 6 HOURS PRN
Qty: 12 TABLET | Refills: 0 | Status: SHIPPED | OUTPATIENT
Start: 2022-10-26 | End: 2022-10-29

## 2022-10-26 RX ORDER — DEXAMETHASONE SODIUM PHOSPHATE 4 MG/ML
VIAL (ML) INJECTION AS NEEDED
Status: DISCONTINUED | OUTPATIENT
Start: 2022-10-26 | End: 2022-10-26 | Stop reason: SURG

## 2022-10-26 RX ORDER — ACETAMINOPHEN 500 MG
1000 TABLET ORAL ONCE
Status: COMPLETED | OUTPATIENT
Start: 2022-10-26 | End: 2022-10-26

## 2022-10-26 RX ORDER — LIDOCAINE HYDROCHLORIDE 10 MG/ML
INJECTION, SOLUTION EPIDURAL; INFILTRATION; INTRACAUDAL; PERINEURAL AS NEEDED
Status: DISCONTINUED | OUTPATIENT
Start: 2022-10-26 | End: 2022-10-26 | Stop reason: SURG

## 2022-10-26 RX ORDER — METOCLOPRAMIDE 10 MG/1
10 TABLET ORAL ONCE
Status: COMPLETED | OUTPATIENT
Start: 2022-10-26 | End: 2022-10-26

## 2022-10-26 RX ORDER — MORPHINE SULFATE 10 MG/ML
6 INJECTION, SOLUTION INTRAMUSCULAR; INTRAVENOUS EVERY 10 MIN PRN
Status: DISCONTINUED | OUTPATIENT
Start: 2022-10-26 | End: 2022-10-26

## 2022-10-26 RX ORDER — HYDROMORPHONE HYDROCHLORIDE 1 MG/ML
0.2 INJECTION, SOLUTION INTRAMUSCULAR; INTRAVENOUS; SUBCUTANEOUS EVERY 5 MIN PRN
Status: DISCONTINUED | OUTPATIENT
Start: 2022-10-26 | End: 2022-10-26

## 2022-10-26 RX ORDER — MORPHINE SULFATE 4 MG/ML
2 INJECTION, SOLUTION INTRAMUSCULAR; INTRAVENOUS EVERY 10 MIN PRN
Status: DISCONTINUED | OUTPATIENT
Start: 2022-10-26 | End: 2022-10-26

## 2022-10-26 RX ORDER — LIDOCAINE HYDROCHLORIDE AND EPINEPHRINE 10; 10 MG/ML; UG/ML
INJECTION, SOLUTION INFILTRATION; PERINEURAL AS NEEDED
Status: DISCONTINUED | OUTPATIENT
Start: 2022-10-26 | End: 2022-10-26 | Stop reason: HOSPADM

## 2022-10-26 RX ORDER — AMOXICILLIN AND CLAVULANATE POTASSIUM 875; 125 MG/1; MG/1
1 TABLET, FILM COATED ORAL 2 TIMES DAILY
Qty: 14 TABLET | Refills: 0 | Status: SHIPPED | OUTPATIENT
Start: 2022-10-26 | End: 2022-11-02

## 2022-10-26 RX ORDER — NALOXONE HYDROCHLORIDE 0.4 MG/ML
80 INJECTION, SOLUTION INTRAMUSCULAR; INTRAVENOUS; SUBCUTANEOUS AS NEEDED
Status: DISCONTINUED | OUTPATIENT
Start: 2022-10-26 | End: 2022-10-26

## 2022-10-26 RX ORDER — ROCURONIUM BROMIDE 10 MG/ML
INJECTION, SOLUTION INTRAVENOUS AS NEEDED
Status: DISCONTINUED | OUTPATIENT
Start: 2022-10-26 | End: 2022-10-26 | Stop reason: SURG

## 2022-10-26 RX ORDER — MORPHINE SULFATE 4 MG/ML
4 INJECTION, SOLUTION INTRAMUSCULAR; INTRAVENOUS EVERY 10 MIN PRN
Status: DISCONTINUED | OUTPATIENT
Start: 2022-10-26 | End: 2022-10-26

## 2022-10-26 RX ORDER — SODIUM CHLORIDE/ALOE VERA
GEL (GRAM) NASAL AS NEEDED
Status: DISCONTINUED | OUTPATIENT
Start: 2022-10-26 | End: 2022-10-26 | Stop reason: HOSPADM

## 2022-10-26 RX ORDER — ONDANSETRON 2 MG/ML
INJECTION INTRAMUSCULAR; INTRAVENOUS AS NEEDED
Status: DISCONTINUED | OUTPATIENT
Start: 2022-10-26 | End: 2022-10-26 | Stop reason: SURG

## 2022-10-26 RX ORDER — FAMOTIDINE 20 MG/1
20 TABLET, FILM COATED ORAL ONCE
Status: COMPLETED | OUTPATIENT
Start: 2022-10-26 | End: 2022-10-26

## 2022-10-26 RX ORDER — BACITRACIN ZINC 500 [USP'U]/G
OINTMENT TOPICAL AS NEEDED
Status: DISCONTINUED | OUTPATIENT
Start: 2022-10-26 | End: 2022-10-26 | Stop reason: HOSPADM

## 2022-10-26 RX ADMIN — LIDOCAINE HYDROCHLORIDE 50 MG: 10 INJECTION, SOLUTION EPIDURAL; INFILTRATION; INTRACAUDAL; PERINEURAL at 14:16:00

## 2022-10-26 RX ADMIN — SODIUM CHLORIDE, SODIUM LACTATE, POTASSIUM CHLORIDE, CALCIUM CHLORIDE: 600; 310; 30; 20 INJECTION, SOLUTION INTRAVENOUS at 14:08:00

## 2022-10-26 RX ADMIN — SODIUM CHLORIDE, SODIUM LACTATE, POTASSIUM CHLORIDE, CALCIUM CHLORIDE: 600; 310; 30; 20 INJECTION, SOLUTION INTRAVENOUS at 14:54:00

## 2022-10-26 RX ADMIN — SODIUM CHLORIDE, SODIUM LACTATE, POTASSIUM CHLORIDE, CALCIUM CHLORIDE: 600; 310; 30; 20 INJECTION, SOLUTION INTRAVENOUS at 15:29:00

## 2022-10-26 RX ADMIN — ONDANSETRON 4 MG: 2 INJECTION INTRAMUSCULAR; INTRAVENOUS at 14:24:00

## 2022-10-26 RX ADMIN — ROCURONIUM BROMIDE 5 MG: 10 INJECTION, SOLUTION INTRAVENOUS at 14:55:00

## 2022-10-26 RX ADMIN — ROCURONIUM BROMIDE 40 MG: 10 INJECTION, SOLUTION INTRAVENOUS at 14:17:00

## 2022-10-26 RX ADMIN — DEXAMETHASONE SODIUM PHOSPHATE 10 MG: 4 MG/ML VIAL (ML) INJECTION at 14:24:00

## 2022-10-26 RX ADMIN — GLYCOPYRROLATE 0.1 MG: 0.2 INJECTION, SOLUTION INTRAMUSCULAR; INTRAVENOUS at 14:08:00

## 2022-10-26 RX ADMIN — CEFAZOLIN SODIUM/WATER 2 G: 2 G/20 ML SYRINGE (ML) INTRAVENOUS at 14:13:00

## 2022-10-26 NOTE — OPERATIVE REPORT
DATE OF SURGERY: October 26, 2022  PREOPERATIVE DIAGNOSIS:   Tonsillar hypertrophy  Inferior turbinate hypertrophy  POSTOPERATIVE DIAGNOSIS: Same. OPERATIVE PROCEDURE:   Bilateral tonsillectomy  Bilateral inferior turbinate reduction. SURGEON: Kiran Valera M.D.  (Otolaryngology)  Assistant: None  ANESTHESIA:  GENERAL. Indications: This is a 17-year-old man with significant snoring who has evidence of tonsillar hypertrophy in clinic. He also has nasal obstruction with deviated septum and inferior turbinate hypertrophy. His nasal breathing is only partially relieved with medical management. The above procedures are indicated. Consent was obtained. Findings: 3+ tonsils bilaterally  Inferior turbinate hypertrophy bilaterally  PROCEDURE: Patient was taken to the operating room and placed supine on the operating table. They underwent an uneventful intubation. The table was turned and the patient was positioned and draped in usual fashion. Timeout was performed all parties were in agreement. A mouthgag was inserted and suspended on the Gallegos stand. A red rubber was inserted through the left nares to suspend the palate. Starting on the right, the tonsil was grasped with a straight Allis clamp and pulled medially. Dissection was performed with Bovie cautery. I first incised to the anterior pillar to identify the superior pole of the tonsil. I followed this plane inferiorly around the lateral aspect of the tonsil and around the inferior pole and gradually dissected the tonsil lateral and medial with care not to violate the underlying muscular bed until the tonsil was completely excised. This was then sent for permanent pathology. Hemostasis was obtained with suction Bovie. Mirror procedure was performed on the left side. Hemostasis was then achieved in both tonsillar fossa with suction Bovie. The mouthgag was released and then resuspended.   A Valsalva was performed which did not reveal any further bleeding. Copious irrigation was performed and no further bleeding was identified. Attention was then turned to inferior turbinate reduction. Afrin-soaked pledgets were placed in each nasal cavity. Following this they were removed and injection with 1 cc of 1% lidocaine with 1 100,000 epinephrine was injected into the head of each inferior turbinate. After adequate anesthesia the inferior turbinates were infractured with a Montrose. Starting on the left I used a Coblator on a setting of 5. I inserted the wand through the head of the inferior turbinate into the turbinate tissue along its length ablating the tissue in a posterior to anterior fashion. Then removed the wand and outfractured the turbinate with a butter knife. The mirror procedure was performed on the right side. Hemostasis was then seen to have been achieved. Pledgets were inserted back into the nasal cavity which were removed prior to extubation. Patient was handed over to anesthesia who successfully extubated her. The patient was breathing stable and transported to PACU in stable condition.    Estimated blood loss: 5cc  Implants or drains: None  Specimen: Bilateral tonsils  Urine output: Per anesthesia

## 2022-10-26 NOTE — ANESTHESIA PROCEDURE NOTES
Airway  Date/Time: 10/26/2022 2:19 PM  Urgency: Elective      General Information and Staff    Patient location during procedure: OR  Anesthesiologist: Bahman Ramirez MD  Resident/CRNA: Chelsi Reed CRNA  Performed: CRNA     Indications and Patient Condition  Indications for airway management: anesthesia  Sedation level: deep  Preoxygenated: yes  Patient position: sniffing  Mask difficulty assessment: 2 - vent by mask + OA or adjuvant +/- NMBA    Final Airway Details  Final airway type: endotracheal airway      Successful airway: ETT and oral DEWAYNE  Cuffed: yes   Successful intubation technique: direct laryngoscopy  Endotracheal tube insertion site: oral  Blade: Juancarlos  Blade size: #3  ETT size (mm): 7.5    Placement verified by: chest auscultation and capnometry   Measured from: lips  Number of attempts at approach: 1    Additional Comments  Dental exam unchanged from pre op  Taped midline

## 2022-10-27 ENCOUNTER — TELEPHONE (OUTPATIENT)
Dept: OTOLARYNGOLOGY | Facility: CLINIC | Age: 22
End: 2022-10-27

## 2022-10-27 NOTE — TELEPHONE ENCOUNTER
Per pt returning missed call, states will be sleeping for the next few hours and to call later in the afternoon.  Please advise

## 2022-10-28 ENCOUNTER — TELEPHONE (OUTPATIENT)
Dept: OTOLARYNGOLOGY | Facility: CLINIC | Age: 22
End: 2022-10-28

## 2022-10-28 NOTE — TELEPHONE ENCOUNTER
Pt is post op day 1 tonsillectomy. Per pt  is doing ok, pt c/o of pain , drinking plenty of fluids, no bleeding or fever. Advised pt pain can worsen post op and radiate to jaw, ears, and is not abnormal, pt to start steroid tomorrow as advised. Also  pt to push fluids at least 1 quart a day, limited activity (no running or jumping for the next two weeks), soft foods for the next two weeks and pt to contact our office if fever greater than 102, any bleeding ( bright red blood). Pt verbalized understanding  Pt is post op day 1 SMR. Per  Pt pt is doing well no bleeding, advised pt no bending or heavy lifting for the next week and pt is not to blow nose until seen by   Advised pt she can start using OTC saline nasal spray daily, afrin up to 5 days post op. Reviewed post op medications. Advised pt to contact our office if any increased bleeding  (saturating more than 4 mustache dressings within the hour) or fever greater than 102. Pt verbalized understanding.

## 2022-11-02 ENCOUNTER — OFFICE VISIT (OUTPATIENT)
Dept: OTOLARYNGOLOGY | Facility: CLINIC | Age: 22
End: 2022-11-02
Payer: COMMERCIAL

## 2022-11-02 ENCOUNTER — TELEPHONE (OUTPATIENT)
Dept: INTERNAL MEDICINE CLINIC | Facility: CLINIC | Age: 22
End: 2022-11-02

## 2022-11-02 VITALS — DIASTOLIC BLOOD PRESSURE: 78 MMHG | SYSTOLIC BLOOD PRESSURE: 138 MMHG | HEART RATE: 67 BPM | TEMPERATURE: 98 F

## 2022-11-02 DIAGNOSIS — J34.2 NASAL SEPTAL DEVIATION: ICD-10-CM

## 2022-11-02 DIAGNOSIS — J35.1 TONSILLAR HYPERTROPHY: Primary | ICD-10-CM

## 2022-11-02 PROCEDURE — 3078F DIAST BP <80 MM HG: CPT | Performed by: STUDENT IN AN ORGANIZED HEALTH CARE EDUCATION/TRAINING PROGRAM

## 2022-11-02 PROCEDURE — 3075F SYST BP GE 130 - 139MM HG: CPT | Performed by: STUDENT IN AN ORGANIZED HEALTH CARE EDUCATION/TRAINING PROGRAM

## 2022-11-02 PROCEDURE — 99024 POSTOP FOLLOW-UP VISIT: CPT | Performed by: STUDENT IN AN ORGANIZED HEALTH CARE EDUCATION/TRAINING PROGRAM

## 2022-11-02 RX ORDER — AZELASTINE HYDROCHLORIDE 137 UG/1
2 SPRAY, METERED NASAL 2 TIMES DAILY
Qty: 30 ML | Refills: 2 | Status: SHIPPED | OUTPATIENT
Start: 2022-11-02

## 2022-11-02 RX ORDER — AZITHROMYCIN 250 MG/1
TABLET, FILM COATED ORAL
Qty: 6 TABLET | Refills: 0 | Status: SHIPPED | OUTPATIENT
Start: 2022-11-02

## 2022-11-02 RX ORDER — FLUTICASONE PROPIONATE 50 MCG
2 SPRAY, SUSPENSION (ML) NASAL 2 TIMES DAILY
Qty: 16 G | Refills: 3 | Status: SHIPPED | OUTPATIENT
Start: 2022-11-02

## 2022-11-02 NOTE — TELEPHONE ENCOUNTER
To Dr. Stormy Cohen---    Spoke with pt, reports he is 1 week post op tonsillectomy and turbinate reduction. He had f/u appt with ENT today and BP was 138/78. He was told to f/u with PCP regarding this. States he does take adderall daily and had coffee today. RN inquired about pain and advised that increased pain can elevate BP as well. States his pain has been \"rough\". Reports he was on hydrocodone at first and now alternating tylenol/ibuprofen. Pain typically 6/10. Denies any headache, blurred vision, SOB, chest pain, etc. Per chart review, last several BP's in the office have been 130-140's/70's-80's. Pt inquiring if he should have any lab work done. It does not appear that pt has had any blood work since 2015. Dr. Stormy Cohen, would you like to see pt for this? If so should he have any blood work done?

## 2022-11-02 NOTE — TELEPHONE ENCOUNTER
Please call patient  He is concerned about high blood pressure/ 138  It was recommended that patient speak with primary  Patient would like to have labs ordered which might show any possible conditions patient might have  Tasked to nursing to discuss/advise

## 2022-11-03 ENCOUNTER — TELEPHONE (OUTPATIENT)
Dept: OTOLARYNGOLOGY | Facility: CLINIC | Age: 22
End: 2022-11-03

## 2022-11-03 NOTE — TELEPHONE ENCOUNTER
Per pt when woke up there was blood with drool on his pillow, states experiencing expected post op pain.  Please advise

## 2022-11-03 NOTE — TELEPHONE ENCOUNTER
Per pt not currently bleeding, gargled ice water and no bleeding noted. Advised pt to continue to monitor ,contact office if any increased bleeding bright red blood. Pt verbalized understanding.

## 2022-11-03 NOTE — TELEPHONE ENCOUNTER
Spoke with patient to relay MD message below; Patient verbalized understanding. He will purchase a [employer] or similar BP machine for at home tomorrow. Reviewed appropriate way to record blood pressue daily.

## 2022-11-03 NOTE — TELEPHONE ENCOUNTER
No blood work at this time    Monitor BP; if BP >140/90 on multiple occasions, then RTC in 2-4 weeks; if BP less than 140/90, then advise observation    Please call pt

## 2022-11-04 ENCOUNTER — PATIENT MESSAGE (OUTPATIENT)
Dept: INTERNAL MEDICINE CLINIC | Facility: CLINIC | Age: 22
End: 2022-11-04

## 2022-11-04 ENCOUNTER — TELEPHONE (OUTPATIENT)
Dept: INTERNAL MEDICINE CLINIC | Facility: CLINIC | Age: 22
End: 2022-11-04

## 2022-11-04 NOTE — TELEPHONE ENCOUNTER
----- Message from Chris Rangel sent at 11/4/2022  9:04 AM CDT -----  Regarding: When to take blood pressure   I was recently instructed to log my blood pressure everyday around the same time. I meant to ask this previously, but if I take it in the morning should I take it before or after taking my adderal? Does that matter?

## 2022-11-04 NOTE — TELEPHONE ENCOUNTER
From: Rg Abarca  To: Mert Vinson MD  Sent: 11/4/2022 9:04 AM CDT  Subject: When to take blood pressure     I was recently instructed to log my blood pressure everyday around the same time. I meant to ask this previously, but if I take it in the morning should I take it before or after taking my adderal? Does that matter?

## 2022-11-04 NOTE — TELEPHONE ENCOUNTER
Pelon Dunlap,    I would take blood pressure in early morning before Adderall is taken.     Dr Loan Vasquez

## 2022-11-08 NOTE — TELEPHONE ENCOUNTER
Dr Pee Curran pt is requesting a refill for loratadine-pseudoephedrine, LOV 11/2/2022, please advise.

## 2022-11-21 ENCOUNTER — PATIENT MESSAGE (OUTPATIENT)
Dept: INTERNAL MEDICINE CLINIC | Facility: CLINIC | Age: 22
End: 2022-11-21

## 2022-11-23 ENCOUNTER — OFFICE VISIT (OUTPATIENT)
Dept: OTOLARYNGOLOGY | Facility: CLINIC | Age: 22
End: 2022-11-23
Payer: COMMERCIAL

## 2022-11-23 DIAGNOSIS — J35.1 TONSILLAR HYPERTROPHY: Primary | ICD-10-CM

## 2022-11-23 PROCEDURE — 99024 POSTOP FOLLOW-UP VISIT: CPT | Performed by: STUDENT IN AN ORGANIZED HEALTH CARE EDUCATION/TRAINING PROGRAM

## 2022-11-30 ENCOUNTER — TELEPHONE (OUTPATIENT)
Dept: INTERNAL MEDICINE CLINIC | Facility: CLINIC | Age: 22
End: 2022-11-30

## 2022-11-30 ENCOUNTER — PATIENT MESSAGE (OUTPATIENT)
Dept: INTERNAL MEDICINE CLINIC | Facility: CLINIC | Age: 22
End: 2022-11-30

## 2022-11-30 NOTE — TELEPHONE ENCOUNTER
From: Christiano Abarca  To: Bushra Whittaker MD  Sent: 11/30/2022 8:45 AM CST  Subject: Blood pressure     Douglas been monitoring my blood pressure for close to a month now. I have attached a photo of my daily logs. Is there a next step? Or does everything look okay? No

## 2022-12-01 NOTE — TELEPHONE ENCOUNTER
Dwaine Joshi,    Blood pressure log reviewed. -140 and are in normal range. No specific treatment is needed at this time.     Dr Margarito Mitchell

## 2022-12-07 ENCOUNTER — TELEPHONE (OUTPATIENT)
Dept: INTERNAL MEDICINE CLINIC | Facility: CLINIC | Age: 22
End: 2022-12-07

## 2022-12-07 NOTE — TELEPHONE ENCOUNTER
Please call patient, he is look for assistance for bills to [facility], not covered by insurance  Pt insurance suggesting perhaps patient physician can enter back dated referrals for two visits to [facility], [address], [place]  2/15/22 and 3/31/22  Tasked to [employer]

## 2022-12-22 NOTE — TELEPHONE ENCOUNTER
Spoke to patient explained insurance will not allow us to back date referrals and the facility [facility] is out of network too.

## 2023-01-03 NOTE — TELEPHONE ENCOUNTER
Per 8/15/22 Mychart message from patient-- Montelukast was not helping- see message for details. June Blackboxhart message sent to patient to inquire if he is still taking medication before sending in refill.

## 2023-01-04 NOTE — TELEPHONE ENCOUNTER
Pt returned call  States he does want a refill    Pt advises he also sent my chart message - please see message below  No worries on the delay. After discussing with a specialist and having surgery I have been using and I am going to continue using that medication. Thanks.

## 2023-01-05 RX ORDER — MONTELUKAST SODIUM 10 MG/1
10 TABLET ORAL NIGHTLY
Qty: 90 TABLET | Refills: 3 | Status: SHIPPED | OUTPATIENT
Start: 2023-01-05

## 2023-01-09 ENCOUNTER — PATIENT MESSAGE (OUTPATIENT)
Dept: INTERNAL MEDICINE CLINIC | Facility: CLINIC | Age: 23
End: 2023-01-09

## 2023-01-09 ENCOUNTER — TELEPHONE (OUTPATIENT)
Dept: INTERNAL MEDICINE CLINIC | Facility: CLINIC | Age: 23
End: 2023-01-09

## 2023-01-09 DIAGNOSIS — E66.9 OBESITY, CLASS II, BMI 35-39.9: Primary | ICD-10-CM

## 2023-01-09 NOTE — TELEPHONE ENCOUNTER
----- Message from Jose Manuel Monae sent at 1/9/2023  1:32 PM CST -----  Regarding: Weight Gain  Prior to my tonsil surgery we spoke about potentially seeing someone about my weight and steps to take to loose weight. As of today I weighed in at 240lb and it is concerning.

## 2023-01-10 NOTE — TELEPHONE ENCOUNTER
Laci French,    I have placed a referral for bariatric medicine and obesity specialist, Dr Tirso Roman    [address] [place] [address] [phone]    I would call to schedule and appointment. Their office can pull the referral from the electronic record.     Dr Clark Bonds

## 2023-02-20 ENCOUNTER — TELEPHONE (OUTPATIENT)
Dept: INTERNAL MEDICINE CLINIC | Facility: CLINIC | Age: 23
End: 2023-02-20

## 2023-02-20 ENCOUNTER — PATIENT MESSAGE (OUTPATIENT)
Dept: INTERNAL MEDICINE CLINIC | Facility: CLINIC | Age: 23
End: 2023-02-20

## 2023-02-20 DIAGNOSIS — F98.8 ATTENTION DEFICIT DISORDER, UNSPECIFIED HYPERACTIVITY PRESENCE: ICD-10-CM

## 2023-02-20 RX ORDER — DEXTROAMPHETAMINE SACCHARATE, AMPHETAMINE ASPARTATE MONOHYDRATE, DEXTROAMPHETAMINE SULFATE AND AMPHETAMINE SULFATE 7.5; 7.5; 7.5; 7.5 MG/1; MG/1; MG/1; MG/1
30 CAPSULE, EXTENDED RELEASE ORAL DAILY
Qty: 30 CAPSULE | Refills: 0 | Status: CANCELLED | OUTPATIENT
Start: 2023-02-20 | End: 2023-03-22

## 2023-02-20 RX ORDER — DEXTROAMPHETAMINE SACCHARATE, AMPHETAMINE ASPARTATE MONOHYDRATE, DEXTROAMPHETAMINE SULFATE AND AMPHETAMINE SULFATE 7.5; 7.5; 7.5; 7.5 MG/1; MG/1; MG/1; MG/1
30 CAPSULE, EXTENDED RELEASE ORAL DAILY
Qty: 30 CAPSULE | Refills: 0 | Status: SHIPPED | OUTPATIENT
Start: 2023-02-20 | End: 2023-03-22

## 2023-02-20 RX ORDER — DEXTROAMPHETAMINE SACCHARATE, AMPHETAMINE ASPARTATE MONOHYDRATE, DEXTROAMPHETAMINE SULFATE AND AMPHETAMINE SULFATE 7.5; 7.5; 7.5; 7.5 MG/1; MG/1; MG/1; MG/1
30 CAPSULE, EXTENDED RELEASE ORAL DAILY
Qty: 30 CAPSULE | Refills: 0 | Status: SHIPPED | OUTPATIENT
Start: 2023-03-23 | End: 2023-04-22

## 2023-02-20 RX ORDER — DEXTROAMPHETAMINE SACCHARATE, AMPHETAMINE ASPARTATE MONOHYDRATE, DEXTROAMPHETAMINE SULFATE AND AMPHETAMINE SULFATE 7.5; 7.5; 7.5; 7.5 MG/1; MG/1; MG/1; MG/1
30 CAPSULE, EXTENDED RELEASE ORAL DAILY
Qty: 30 CAPSULE | Refills: 0 | Status: SHIPPED | OUTPATIENT
Start: 2023-04-23 | End: 2023-05-23

## 2023-02-20 NOTE — TELEPHONE ENCOUNTER
To MD:  The above refill request is for a controlled substance. Please review pended medication order. Print and sign for staff to fax to pharmacy or prescribe electronically.     Last office visit: 5/5/22  Last time refill sent and quantity/refills: 11/21/22 90 day panel   Per [place] , last dispensed 1/22/23

## 2023-02-21 NOTE — TELEPHONE ENCOUNTER
From: Alexandria Garcia  Sent: 2/21/2023 8:16 AM CST  To: Laury Johnson Clinical Staff  Subject: Request for refill    Sorry, I got the refill that was sent last night. I recently was on the phone with Jennifer Sprague regarding getting the location changed because they are out at all nearby [facility].

## 2023-03-24 ENCOUNTER — PATIENT MESSAGE (OUTPATIENT)
Dept: INTERNAL MEDICINE CLINIC | Facility: CLINIC | Age: 23
End: 2023-03-24

## 2023-03-24 ENCOUNTER — TELEPHONE (OUTPATIENT)
Dept: INTERNAL MEDICINE CLINIC | Facility: CLINIC | Age: 23
End: 2023-03-24

## 2023-03-24 DIAGNOSIS — M25.579 ACUTE ANKLE PAIN, UNSPECIFIED LATERALITY: Primary | ICD-10-CM

## 2023-03-24 NOTE — TELEPHONE ENCOUNTER
From: Cleave Schirmer Muellner  To: George Ledesma MD  Sent: 3/24/2023 8:07 AM CDT  Subject: Foot doctor     I sustained an injury to my foot while running last Saturday. It has been almost a week but I am still in pain when I walk. Can I get a referral to see a foot doctor?     Thanks,    Belle Vera

## 2023-03-24 NOTE — TELEPHONE ENCOUNTER
[facility],    Referral placed to see podiatrist, Dr Gurpreet Holloway    [address] [address] [phone]    If any questions, do not hesitate to call,     Dr Toni Rodriguez

## 2023-03-24 NOTE — TELEPHONE ENCOUNTER
----- Message from Belle Yen sent at 3/24/2023  8:07 AM CDT -----  Regarding: Foot doctor   Contact: [phone]  I sustained an injury to my foot while running last Saturday. It has been almost a week but I am still in pain when I walk. Can I get a referral to see a foot doctor?     Thanks,    Dori Sanchez

## 2023-03-31 RX ORDER — AZELASTINE 1 MG/ML
SPRAY, METERED NASAL
Qty: 30 ML | Refills: 3 | Status: SHIPPED | OUTPATIENT
Start: 2023-03-31

## 2023-04-07 ENCOUNTER — PATIENT MESSAGE (OUTPATIENT)
Dept: INTERNAL MEDICINE CLINIC | Facility: CLINIC | Age: 23
End: 2023-04-07

## 2023-04-07 NOTE — TELEPHONE ENCOUNTER
From: Dada Abarca  To:  Adrienne Rodriguez MD  Sent: 4/7/2023 11:37 AM CDT  Subject: Referral for Therapist     I would like to request a referral for a therapist

## 2023-04-13 ENCOUNTER — TELEPHONE (OUTPATIENT)
Dept: INTERNAL MEDICINE CLINIC | Facility: CLINIC | Age: 23
End: 2023-04-13

## 2023-04-13 ENCOUNTER — OFFICE VISIT (OUTPATIENT)
Dept: INTERNAL MEDICINE CLINIC | Facility: CLINIC | Age: 23
End: 2023-04-13

## 2023-04-13 VITALS
OXYGEN SATURATION: 99 % | WEIGHT: 239.81 LBS | HEIGHT: 67 IN | HEART RATE: 84 BPM | SYSTOLIC BLOOD PRESSURE: 124 MMHG | TEMPERATURE: 98 F | DIASTOLIC BLOOD PRESSURE: 66 MMHG | BODY MASS INDEX: 37.64 KG/M2

## 2023-04-13 DIAGNOSIS — J35.1 TONSILLAR HYPERTROPHY: ICD-10-CM

## 2023-04-13 DIAGNOSIS — F98.8 ATTENTION DEFICIT DISORDER, UNSPECIFIED HYPERACTIVITY PRESENCE: ICD-10-CM

## 2023-04-13 DIAGNOSIS — Z00.00 PHYSICAL EXAM, ANNUAL: Primary | ICD-10-CM

## 2023-04-13 DIAGNOSIS — M79.673 PAIN OF FOOT, UNSPECIFIED LATERALITY: ICD-10-CM

## 2023-04-13 DIAGNOSIS — J30.9 ALLERGIC RHINITIS, UNSPECIFIED SEASONALITY, UNSPECIFIED TRIGGER: ICD-10-CM

## 2023-04-13 DIAGNOSIS — E66.9 OBESITY, CLASS II, BMI 35-39.9: ICD-10-CM

## 2023-04-13 DIAGNOSIS — R73.9 HYPERGLYCEMIA: ICD-10-CM

## 2023-04-13 DIAGNOSIS — F41.9 ANXIETY: ICD-10-CM

## 2023-04-13 PROCEDURE — 3074F SYST BP LT 130 MM HG: CPT | Performed by: INTERNAL MEDICINE

## 2023-04-13 PROCEDURE — 3078F DIAST BP <80 MM HG: CPT | Performed by: INTERNAL MEDICINE

## 2023-04-13 PROCEDURE — 99395 PREV VISIT EST AGE 18-39: CPT | Performed by: INTERNAL MEDICINE

## 2023-04-13 PROCEDURE — 3008F BODY MASS INDEX DOCD: CPT | Performed by: INTERNAL MEDICINE

## 2023-04-13 RX ORDER — EPINEPHRINE 0.15 MG/.3ML
0.3 INJECTION INTRAMUSCULAR AS NEEDED
Qty: 2 EACH | Refills: 1 | Status: SHIPPED | OUTPATIENT
Start: 2023-04-13 | End: 2024-04-12

## 2023-04-13 NOTE — TELEPHONE ENCOUNTER
Kameron rader P [facility] Clinical Staff (supporting April Lopez MD)        Ericka Stovall 4/13/23 10:42 AM  I just saw the doctor today, but I forgot it to ask for a referral for a therapist. Could I get a referral?

## 2023-04-13 NOTE — TELEPHONE ENCOUNTER
Patient is calling he saw Dr Lexi Beltran today at the visit they discussed trying a new medication   Wegovy    Patient has decided he would like to try the medication please call into [facility] on [address]    Please call patient when this has been called in [phone]

## 2023-04-13 NOTE — TELEPHONE ENCOUNTER
To Dr. Dorothy Davalos:  Pt seen today for physical.  Requesting referral for therapist.     Behavioral health referral pended.

## 2023-04-14 ENCOUNTER — OFFICE VISIT (OUTPATIENT)
Dept: PODIATRY CLINIC | Facility: CLINIC | Age: 23
End: 2023-04-14

## 2023-04-14 ENCOUNTER — LAB ENCOUNTER (OUTPATIENT)
Dept: LAB | Facility: HOSPITAL | Age: 23
End: 2023-04-14
Attending: INTERNAL MEDICINE
Payer: COMMERCIAL

## 2023-04-14 DIAGNOSIS — R73.9 HYPERGLYCEMIA: ICD-10-CM

## 2023-04-14 DIAGNOSIS — Z00.00 PHYSICAL EXAM, ANNUAL: ICD-10-CM

## 2023-04-14 DIAGNOSIS — M21.6X2 EQUINUS DEFORMITY OF BOTH FEET: ICD-10-CM

## 2023-04-14 DIAGNOSIS — M21.6X1 EQUINUS DEFORMITY OF BOTH FEET: ICD-10-CM

## 2023-04-14 DIAGNOSIS — M72.2 PLANTAR FASCIITIS OF LEFT FOOT: Primary | ICD-10-CM

## 2023-04-14 LAB
ALBUMIN SERPL-MCNC: 4.1 G/DL (ref 3.4–5)
ALBUMIN/GLOB SERPL: 1.2 {RATIO} (ref 1–2)
ALP LIVER SERPL-CCNC: 78 U/L
ALT SERPL-CCNC: 33 U/L
ANION GAP SERPL CALC-SCNC: 5 MMOL/L (ref 0–18)
AST SERPL-CCNC: 27 U/L (ref 15–37)
BASOPHILS # BLD AUTO: 0.03 X10(3) UL (ref 0–0.2)
BASOPHILS NFR BLD AUTO: 0.5 %
BILIRUB SERPL-MCNC: 0.6 MG/DL (ref 0.1–2)
BUN BLD-MCNC: 12 MG/DL (ref 7–18)
BUN/CREAT SERPL: 12 (ref 10–20)
CALCIUM BLD-MCNC: 9.2 MG/DL (ref 8.5–10.1)
CHLORIDE SERPL-SCNC: 105 MMOL/L (ref 98–112)
CHOLEST SERPL-MCNC: 141 MG/DL (ref ?–200)
CO2 SERPL-SCNC: 30 MMOL/L (ref 21–32)
CREAT BLD-MCNC: 1 MG/DL
DEPRECATED RDW RBC AUTO: 39.7 FL (ref 35.1–46.3)
EOSINOPHIL # BLD AUTO: 0.09 X10(3) UL (ref 0–0.7)
EOSINOPHIL NFR BLD AUTO: 1.5 %
ERYTHROCYTE [DISTWIDTH] IN BLOOD BY AUTOMATED COUNT: 13.1 % (ref 11–15)
EST. AVERAGE GLUCOSE BLD GHB EST-MCNC: 94 MG/DL (ref 68–126)
FASTING PATIENT LIPID ANSWER: YES
FASTING STATUS PATIENT QL REPORTED: YES
GFR SERPLBLD BASED ON 1.73 SQ M-ARVRAT: 108 ML/MIN/1.73M2 (ref 60–?)
GLOBULIN PLAS-MCNC: 3.4 G/DL (ref 2.8–4.4)
GLUCOSE BLD-MCNC: 89 MG/DL (ref 70–99)
HBA1C MFR BLD: 4.9 % (ref ?–5.7)
HCT VFR BLD AUTO: 41.8 %
HDLC SERPL-MCNC: 58 MG/DL (ref 40–59)
HGB BLD-MCNC: 13.2 G/DL
IMM GRANULOCYTES # BLD AUTO: 0.02 X10(3) UL (ref 0–1)
IMM GRANULOCYTES NFR BLD: 0.3 %
LDLC SERPL CALC-MCNC: 68 MG/DL (ref ?–100)
LYMPHOCYTES # BLD AUTO: 1.58 X10(3) UL (ref 1–4)
LYMPHOCYTES NFR BLD AUTO: 26.5 %
MCH RBC QN AUTO: 26.2 PG (ref 26–34)
MCHC RBC AUTO-ENTMCNC: 31.6 G/DL (ref 31–37)
MCV RBC AUTO: 83.1 FL
MONOCYTES # BLD AUTO: 0.52 X10(3) UL (ref 0.1–1)
MONOCYTES NFR BLD AUTO: 8.7 %
NEUTROPHILS # BLD AUTO: 3.73 X10 (3) UL (ref 1.5–7.7)
NEUTROPHILS # BLD AUTO: 3.73 X10(3) UL (ref 1.5–7.7)
NEUTROPHILS NFR BLD AUTO: 62.5 %
NONHDLC SERPL-MCNC: 83 MG/DL (ref ?–130)
OSMOLALITY SERPL CALC.SUM OF ELEC: 289 MOSM/KG (ref 275–295)
PLATELET # BLD AUTO: 306 10(3)UL (ref 150–450)
POTASSIUM SERPL-SCNC: 4.2 MMOL/L (ref 3.5–5.1)
PROT SERPL-MCNC: 7.5 G/DL (ref 6.4–8.2)
RBC # BLD AUTO: 5.03 X10(6)UL
SODIUM SERPL-SCNC: 140 MMOL/L (ref 136–145)
TRIGL SERPL-MCNC: 74 MG/DL (ref 30–149)
TSI SER-ACNC: 0.95 MIU/ML (ref 0.36–3.74)
VLDLC SERPL CALC-MCNC: 11 MG/DL (ref 0–30)
WBC # BLD AUTO: 6 X10(3) UL (ref 4–11)

## 2023-04-14 PROCEDURE — 80061 LIPID PANEL: CPT

## 2023-04-14 PROCEDURE — 85025 COMPLETE CBC W/AUTO DIFF WBC: CPT

## 2023-04-14 PROCEDURE — 99203 OFFICE O/P NEW LOW 30 MIN: CPT | Performed by: PODIATRIST

## 2023-04-14 PROCEDURE — 80053 COMPREHEN METABOLIC PANEL: CPT

## 2023-04-14 PROCEDURE — 84443 ASSAY THYROID STIM HORMONE: CPT

## 2023-04-14 PROCEDURE — 83036 HEMOGLOBIN GLYCOSYLATED A1C: CPT

## 2023-04-14 PROCEDURE — 36415 COLL VENOUS BLD VENIPUNCTURE: CPT

## 2023-04-17 ENCOUNTER — TELEPHONE (OUTPATIENT)
Dept: INTERNAL MEDICINE CLINIC | Facility: CLINIC | Age: 23
End: 2023-04-17

## 2023-04-17 NOTE — TELEPHONE ENCOUNTER
On 04/17, the following referrals for therapy were provided to the patient:     Donna Suh LCSW or Cecyjosh Castañeda, [address] [facility]   Phone: [address] or [address] [facility]/Rojelio Price [facility]   Phone: [phone]   [email or web address]     [address]   Multiple Locations in [place]   Phone: [phone]   [email or web address]     Fabi Santana and [address] [address]   [address] [address]   Phone: [phone]   [email or web address]     I have provided my contact information if additional resources are needed. I am closing the order at this time. Please feel free to re-refer the patient for navigation as needed.      April Rm, [address] Navigator   [address] [address]   [phone]   [address] [address] [address] [phone]

## 2023-04-19 ENCOUNTER — TELEPHONE (OUTPATIENT)
Dept: INTERNAL MEDICINE CLINIC | Facility: CLINIC | Age: 23
End: 2023-04-19

## 2023-04-19 ENCOUNTER — PATIENT MESSAGE (OUTPATIENT)
Dept: INTERNAL MEDICINE CLINIC | Facility: CLINIC | Age: 23
End: 2023-04-19

## 2023-04-19 NOTE — TELEPHONE ENCOUNTER
----- Message from Jose Manuel Monae sent at 4/19/2023  1:19 PM CDT -----  Regarding: Adderall Dosage  Contact: [phone]  I am curios if it would be possible to decrease my dosage of adderall from 30mg XR to 20mg XR. I have been feeling recently I am less like \"myself\" when taking it daily, and that a lower dosage will support with that.

## 2023-04-19 NOTE — TELEPHONE ENCOUNTER
From: Sierra Abarca  To: Elfego Nunn MD  Sent: 4/19/2023 1:19 PM CDT  Subject: Adderall Dosage    I am curios if it would be possible to decrease my dosage of adderall from 30mg XR to 20mg XR. I have been feeling recently I am less like \"myself\" when taking it daily, and that a lower dosage will support with that.

## 2023-04-26 RX ORDER — DEXTROAMPHETAMINE SACCHARATE, AMPHETAMINE ASPARTATE MONOHYDRATE, DEXTROAMPHETAMINE SULFATE AND AMPHETAMINE SULFATE 7.5; 7.5; 7.5; 7.5 MG/1; MG/1; MG/1; MG/1
30 CAPSULE, EXTENDED RELEASE ORAL DAILY
Qty: 30 CAPSULE | Refills: 0 | Status: CANCELLED | OUTPATIENT
Start: 2023-04-26 | End: 2023-05-26

## 2023-04-27 RX ORDER — DEXTROAMPHETAMINE SACCHARATE, AMPHETAMINE ASPARTATE MONOHYDRATE, DEXTROAMPHETAMINE SULFATE AND AMPHETAMINE SULFATE 7.5; 7.5; 7.5; 7.5 MG/1; MG/1; MG/1; MG/1
30 CAPSULE, EXTENDED RELEASE ORAL DAILY
Qty: 30 CAPSULE | Refills: 0 | Status: CANCELLED | OUTPATIENT
Start: 2023-04-27 | End: 2023-05-27

## 2023-05-02 RX ORDER — LORATADINE PSEUDOEPHEDRINE SULFATE 10; 240 MG/1; MG/1
TABLET, EXTENDED RELEASE ORAL
Qty: 30 TABLET | Refills: 2 | Status: SHIPPED | OUTPATIENT
Start: 2023-05-02

## 2023-05-02 RX ORDER — FLUTICASONE PROPIONATE 50 MCG
2 SPRAY, SUSPENSION (ML) NASAL 2 TIMES DAILY
Qty: 16 G | Refills: 3 | OUTPATIENT
Start: 2023-05-02

## 2023-05-02 RX ORDER — FLUTICASONE PROPIONATE 50 MCG
SPRAY, SUSPENSION (ML) NASAL
Qty: 16 G | Refills: 0 | OUTPATIENT
Start: 2023-05-02

## 2023-05-02 RX ORDER — FLUTICASONE PROPIONATE 50 MCG
2 SPRAY, SUSPENSION (ML) NASAL 2 TIMES DAILY
Qty: 16 G | Refills: 3 | Status: SHIPPED | OUTPATIENT
Start: 2023-05-02

## 2023-05-17 ENCOUNTER — TELEPHONE (OUTPATIENT)
Dept: INTERNAL MEDICINE CLINIC | Facility: CLINIC | Age: 23
End: 2023-05-17

## 2023-05-17 RX ORDER — DEXTROAMPHETAMINE SACCHARATE, AMPHETAMINE ASPARTATE MONOHYDRATE, DEXTROAMPHETAMINE SULFATE AND AMPHETAMINE SULFATE 5; 5; 5; 5 MG/1; MG/1; MG/1; MG/1
20 CAPSULE, EXTENDED RELEASE ORAL DAILY
Qty: 30 CAPSULE | Refills: 0 | Status: SHIPPED | OUTPATIENT
Start: 2023-05-17 | End: 2023-06-16

## 2023-05-17 RX ORDER — DEXTROAMPHETAMINE SACCHARATE, AMPHETAMINE ASPARTATE MONOHYDRATE, DEXTROAMPHETAMINE SULFATE AND AMPHETAMINE SULFATE 5; 5; 5; 5 MG/1; MG/1; MG/1; MG/1
20 CAPSULE, EXTENDED RELEASE ORAL DAILY
Qty: 30 CAPSULE | Refills: 0 | Status: SHIPPED | OUTPATIENT
Start: 2023-07-18 | End: 2023-08-17

## 2023-05-17 RX ORDER — DEXTROAMPHETAMINE SACCHARATE, AMPHETAMINE ASPARTATE MONOHYDRATE, DEXTROAMPHETAMINE SULFATE AND AMPHETAMINE SULFATE 5; 5; 5; 5 MG/1; MG/1; MG/1; MG/1
20 CAPSULE, EXTENDED RELEASE ORAL DAILY
Qty: 30 CAPSULE | Refills: 0 | Status: SHIPPED | OUTPATIENT
Start: 2023-06-17 | End: 2023-07-17

## 2023-05-18 NOTE — TELEPHONE ENCOUNTER
Called and left detailed voicemail for patient , advising that adderall rx is at  awaiting  ? However not sure if that is what he would like to do , call patient to clarify. Rx left at  .

## 2023-05-30 ENCOUNTER — PATIENT MESSAGE (OUTPATIENT)
Dept: INTERNAL MEDICINE CLINIC | Facility: CLINIC | Age: 23
End: 2023-05-30

## 2023-05-30 NOTE — TELEPHONE ENCOUNTER
To Dr. Sondra Hamlin please advise. Patient was changed from adderall 30mg to 20mg because he states it was making him not feel like himself. Patient now asking to go down to 15mg.

## 2023-05-30 NOTE — TELEPHONE ENCOUNTER
From: Sierra Abarca  To: Elfego uNnn MD  Sent: 5/30/2023 11:08 AM CDT  Subject: 20mg to 15mg    I recently went from 30mg xr to 20mg xr adderal. I was curious if for my next months refill, I could get my dosage changed from 20mg to 15mg.

## 2023-06-09 ENCOUNTER — TELEPHONE (OUTPATIENT)
Dept: INTERNAL MEDICINE CLINIC | Facility: CLINIC | Age: 23
End: 2023-06-09

## 2023-06-09 RX ORDER — LORATADINE PSEUDOEPHEDRINE SULFATE 10; 240 MG/1; MG/1
TABLET, EXTENDED RELEASE ORAL
Qty: 30 TABLET | Refills: 0 | Status: SHIPPED | OUTPATIENT
Start: 2023-06-09

## 2023-06-09 NOTE — TELEPHONE ENCOUNTER
----- Message from Swapnil Galeana sent at 2023  1:06 PM CDT -----  Regardinmg to 15mg  Contact: [phone]  I sent this request over a week ago and have not heard back.

## 2023-06-12 RX ORDER — DEXTROAMPHETAMINE SACCHARATE, AMPHETAMINE ASPARTATE MONOHYDRATE, DEXTROAMPHETAMINE SULFATE AND AMPHETAMINE SULFATE 3.75; 3.75; 3.75; 3.75 MG/1; MG/1; MG/1; MG/1
15 CAPSULE, EXTENDED RELEASE ORAL EVERY MORNING
Qty: 30 CAPSULE | Refills: 0 | Status: SHIPPED | OUTPATIENT
Start: 2023-06-12 | End: 2023-06-12

## 2023-06-12 RX ORDER — DEXTROAMPHETAMINE SACCHARATE, AMPHETAMINE ASPARTATE MONOHYDRATE, DEXTROAMPHETAMINE SULFATE AND AMPHETAMINE SULFATE 3.75; 3.75; 3.75; 3.75 MG/1; MG/1; MG/1; MG/1
15 CAPSULE, EXTENDED RELEASE ORAL EVERY MORNING
Qty: 30 CAPSULE | Refills: 0 | Status: SHIPPED | OUTPATIENT
Start: 2023-06-12 | End: 2023-07-12

## 2023-06-12 NOTE — TELEPHONE ENCOUNTER
Patient notified Jacob Parkinson ready to    Please bring to  as patient will stop by later today  Tasked to nursing

## 2023-06-12 NOTE — TELEPHONE ENCOUNTER
Patient is calling to request the Adderall be changed to a handwritten rx    [place] is out pf the medication, patient would like to pick the script up in the office today    Please call when ready [phone]

## 2023-06-12 NOTE — TELEPHONE ENCOUNTER
To MD:  The above refill request is for a controlled substance. Please review pended medication order. Print and sign for staff to fax to pharmacy or prescribe electronically. Was sent to pharmacy - [facility] out of stock - patient wants to  RX - to DR. CRENSHAW

## 2023-06-26 RX ORDER — FLUTICASONE PROPIONATE 50 MCG
2 SPRAY, SUSPENSION (ML) NASAL 2 TIMES DAILY
Qty: 16 G | Refills: 3 | Status: SHIPPED | OUTPATIENT
Start: 2023-06-26

## 2023-07-06 RX ORDER — AZELASTINE 1 MG/ML
SPRAY, METERED NASAL
Qty: 30 ML | Refills: 0 | Status: SHIPPED | OUTPATIENT
Start: 2023-07-06

## 2023-07-17 ENCOUNTER — PATIENT MESSAGE (OUTPATIENT)
Dept: INTERNAL MEDICINE CLINIC | Facility: CLINIC | Age: 23
End: 2023-07-17

## 2023-07-18 ENCOUNTER — TELEPHONE (OUTPATIENT)
Dept: INTERNAL MEDICINE CLINIC | Facility: CLINIC | Age: 23
End: 2023-07-18

## 2023-07-18 RX ORDER — DEXTROAMPHETAMINE SACCHARATE, AMPHETAMINE ASPARTATE MONOHYDRATE, DEXTROAMPHETAMINE SULFATE AND AMPHETAMINE SULFATE 2.5; 2.5; 2.5; 2.5 MG/1; MG/1; MG/1; MG/1
10 CAPSULE, EXTENDED RELEASE ORAL DAILY
Qty: 30 CAPSULE | Refills: 0 | Status: CANCELLED | OUTPATIENT
Start: 2023-08-18 | End: 2023-09-17

## 2023-07-18 RX ORDER — DEXTROAMPHETAMINE SACCHARATE, AMPHETAMINE ASPARTATE MONOHYDRATE, DEXTROAMPHETAMINE SULFATE AND AMPHETAMINE SULFATE 2.5; 2.5; 2.5; 2.5 MG/1; MG/1; MG/1; MG/1
10 CAPSULE, EXTENDED RELEASE ORAL DAILY
Qty: 30 CAPSULE | Refills: 0 | Status: CANCELLED | OUTPATIENT
Start: 2023-09-18 | End: 2023-10-18

## 2023-07-18 RX ORDER — DEXTROAMPHETAMINE SACCHARATE, AMPHETAMINE ASPARTATE MONOHYDRATE, DEXTROAMPHETAMINE SULFATE AND AMPHETAMINE SULFATE 2.5; 2.5; 2.5; 2.5 MG/1; MG/1; MG/1; MG/1
10 CAPSULE, EXTENDED RELEASE ORAL DAILY
Qty: 30 CAPSULE | Refills: 0 | Status: CANCELLED | OUTPATIENT
Start: 2023-07-18 | End: 2023-08-17

## 2023-07-18 NOTE — TELEPHONE ENCOUNTER
To MD:     Please advise on both scripts. 1) Wegovy (FYI initial dosing back ordered til Sept at least)    2) Adderall - OK to decrease per pt request?    This refill request is for a controlled substance. Please review pended medication order. Print and sign for staff to fax to pharmacy or prescribe electronically.     Last office visit: 4/13/23  Last time refill sent and quantity/refills:    Dispensed Written Strength Quantity Refills Days Supply Provider Pharmacy    MIXED AMPHETAMINE SALT 06/12/2023 06/12/2023 15 mg 30 capsule  30 Shelley Yip MD [facility]

## 2023-07-20 RX ORDER — LORATADINE PSEUDOEPHEDRINE SULFATE 10; 240 MG/1; MG/1
1 TABLET, EXTENDED RELEASE ORAL EVERY MORNING
Qty: 30 TABLET | Refills: 0 | Status: SHIPPED | OUTPATIENT
Start: 2023-07-20

## 2023-07-20 RX ORDER — LORATADINE/PSEUDOEPHEDRINE 10MG-240MG
1 TABLET, EXTENDED RELEASE 24 HR ORAL EVERY MORNING
Qty: 30 TABLET | Refills: 0 | OUTPATIENT
Start: 2023-07-20

## 2023-07-20 NOTE — TELEPHONE ENCOUNTER
Patient is calling to check on the requests below. Patient is hoping to hear back today. Patient would like to bring his Adderall down from extended release to instant release and from 15mg to 10mg.

## 2023-08-25 RX ORDER — FLUTICASONE PROPIONATE 50 MCG
2 SPRAY, SUSPENSION (ML) NASAL 2 TIMES DAILY
Qty: 16 G | Refills: 3 | Status: SHIPPED | OUTPATIENT
Start: 2023-08-25

## 2023-08-25 RX ORDER — AZELASTINE 1 MG/ML
2 SPRAY, METERED NASAL 2 TIMES DAILY
Qty: 30 ML | Refills: 0 | Status: SHIPPED | OUTPATIENT
Start: 2023-08-25

## 2023-08-31 RX ORDER — LORATADINE PSEUDOEPHEDRINE SULFATE 10; 240 MG/1; MG/1
1 TABLET, EXTENDED RELEASE ORAL EVERY MORNING
Qty: 30 TABLET | Refills: 0 | OUTPATIENT
Start: 2023-08-31

## 2023-08-31 RX ORDER — LORATADINE/PSEUDOEPHEDRINE 10MG-240MG
1 TABLET, EXTENDED RELEASE 24 HR ORAL EVERY MORNING
Qty: 30 TABLET | Refills: 0 | OUTPATIENT
Start: 2023-08-31

## 2023-08-31 RX ORDER — LORATADINE PSEUDOEPHEDRINE SULFATE 10; 240 MG/1; MG/1
1 TABLET, EXTENDED RELEASE ORAL EVERY MORNING
Qty: 30 TABLET | Refills: 0 | Status: SHIPPED | OUTPATIENT
Start: 2023-08-31

## 2023-09-01 RX ORDER — AZELASTINE 1 MG/ML
2 SPRAY, METERED NASAL 2 TIMES DAILY
Qty: 30 ML | Refills: 0 | OUTPATIENT
Start: 2023-09-01

## 2023-09-02 ENCOUNTER — HOSPITAL ENCOUNTER (OUTPATIENT)
Age: 23
Discharge: HOME | End: 2023-09-02
Payer: COMMERCIAL

## 2023-09-02 ENCOUNTER — APPOINTMENT (OUTPATIENT)
Dept: GENERAL RADIOLOGY | Age: 23
End: 2023-09-02
Attending: NURSE PRACTITIONER
Payer: COMMERCIAL

## 2023-09-02 VITALS
DIASTOLIC BLOOD PRESSURE: 54 MMHG | SYSTOLIC BLOOD PRESSURE: 125 MMHG | OXYGEN SATURATION: 99 % | HEART RATE: 80 BPM | TEMPERATURE: 98 F | RESPIRATION RATE: 16 BRPM

## 2023-09-02 DIAGNOSIS — S99.912A INJURY OF LEFT ANKLE, INITIAL ENCOUNTER: Primary | ICD-10-CM

## 2023-09-02 DIAGNOSIS — S93.402A MODERATE LEFT ANKLE SPRAIN, INITIAL ENCOUNTER: ICD-10-CM

## 2023-09-02 PROCEDURE — A6449 LT COMPRES BAND >=3" <5"/YD: HCPCS | Performed by: NURSE PRACTITIONER

## 2023-09-02 PROCEDURE — L4350 ANKLE CONTROL ORTHO PRE OTS: HCPCS | Performed by: NURSE PRACTITIONER

## 2023-09-02 PROCEDURE — 99203 OFFICE O/P NEW LOW 30 MIN: CPT | Performed by: NURSE PRACTITIONER

## 2023-09-02 PROCEDURE — 73610 X-RAY EXAM OF ANKLE: CPT | Performed by: NURSE PRACTITIONER

## 2023-09-02 NOTE — ED INITIAL ASSESSMENT (HPI)
Pt with L ankle pain after hurting it playing basketball this morning. Pt rated pain 4/10, but pain increases with ambulation. Pt reports numbness, denies tingling.

## 2023-09-02 NOTE — DISCHARGE INSTRUCTIONS
Rest  Ace wrap and ankle brace during the day, take off at night   Ice over a sock or ace wrap 20 minutes at a time a few times per day  Elevate extremity on 2 pillows when at rest  Tylenol 650 mg every 4 hours for pain  Ibuprofen 600 mg every 6 hours for pain, take with food  If no improvement in 1 week, please follow up with Dr. Sean Burnette

## 2023-09-19 RX ORDER — AZELASTINE 1 MG/ML
2 SPRAY, METERED NASAL 2 TIMES DAILY
Qty: 30 ML | Refills: 0 | Status: SHIPPED | OUTPATIENT
Start: 2023-09-19

## 2023-10-10 RX ORDER — FLUTICASONE PROPIONATE 50 MCG
2 SPRAY, SUSPENSION (ML) NASAL 2 TIMES DAILY
Qty: 16 G | Refills: 3 | Status: SHIPPED | OUTPATIENT
Start: 2023-10-10

## 2023-10-10 NOTE — TELEPHONE ENCOUNTER
This refill request is being sent to the provider for the following reason:  []Patient has not had an appointment within the past 12 months but has made an appointment on: ___  [x]Medication is not within protocol  []Patient did not complete follow up recommendations  []Other: ___  Dr. Osmani Howell, please review and send. Thank you.

## 2023-10-12 RX ORDER — LORATADINE PSEUDOEPHEDRINE SULFATE 10; 240 MG/1; MG/1
1 TABLET, EXTENDED RELEASE ORAL EVERY MORNING
Qty: 30 TABLET | Refills: 0 | Status: SHIPPED | OUTPATIENT
Start: 2023-10-12

## 2023-10-16 RX ORDER — AZELASTINE 1 MG/ML
2 SPRAY, METERED NASAL 2 TIMES DAILY
Qty: 30 ML | Refills: 0 | Status: SHIPPED | OUTPATIENT
Start: 2023-10-16

## 2023-10-16 NOTE — TELEPHONE ENCOUNTER
Refilled per protocol    Refilled 1    Advised to schedule yearly follow up.      Anti-histamine Medications:  Loratadine  Cetirizine  Azelastin  Fexofenadine  Levocetirizine  Corticosteroid Medications:  Fluticasone  Mometasone (Nasonex)  Leukotriene Receptor Antagonist:  Montelukast (Singulair)    Protocol Criteria:  Appointment scheduled in past 12 months  Refill: \"Per Protocol: No Cosign Required\"  Approval of this protocol is dependent on manual look up of last PROVIDER ASSESSMENT AND PLAN and patient compliance with PROVIDER ASSESSMENT AND PLAN follow up recommendations    Permitted Refill Frequency:  1 year (may provide 90 day supply with 3 refills)  Recent Outpatient Visits              6 months ago Plantar fasciitis of left foot    [facility], [address], [place], [place]    Office Visit    6 months ago Physical exam, annual    Hannah Marcelo MD    Office Visit    10 months ago Tonsillar hypertrophy    [address] [place], [place] [address]Loren MD    Office Visit    11 months ago Tonsillar hypertrophy    [address] [place], [place] [address]Loren MD    Office Visit    1 year ago Myopia of both eyes with astigmatism    [address] [place] [address], Aundrea Saucedo MD    Office Visit

## 2023-11-06 ENCOUNTER — TELEPHONE (OUTPATIENT)
Dept: INTERNAL MEDICINE CLINIC | Facility: CLINIC | Age: 23
End: 2023-11-06

## 2023-11-06 NOTE — TELEPHONE ENCOUNTER
To MD:  The above refill request is for a controlled substance. Please review pended medication order. Print and sign for staff to fax to pharmacy or prescribe electronically. Last office visit:4/13/23  Last time refill sent and quantity/refills:   7/18/23 - #30 no refills.        Please advise

## 2023-11-07 RX ORDER — DEXTROAMPHETAMINE SACCHARATE, AMPHETAMINE ASPARTATE MONOHYDRATE, DEXTROAMPHETAMINE SULFATE AND AMPHETAMINE SULFATE 3.75; 3.75; 3.75; 3.75 MG/1; MG/1; MG/1; MG/1
15 CAPSULE, EXTENDED RELEASE ORAL EVERY MORNING
Qty: 30 CAPSULE | Refills: 0 | Status: SHIPPED | OUTPATIENT
Start: 2023-11-07 | End: 2023-12-07

## 2023-11-15 RX ORDER — AZELASTINE 1 MG/ML
2 SPRAY, METERED NASAL 2 TIMES DAILY
Qty: 30 ML | Refills: 0 | Status: SHIPPED | OUTPATIENT
Start: 2023-11-15

## 2023-11-15 RX ORDER — FLUTICASONE PROPIONATE 50 MCG
2 SPRAY, SUSPENSION (ML) NASAL 2 TIMES DAILY
Qty: 16 G | Refills: 3 | Status: SHIPPED | OUTPATIENT
Start: 2023-11-15

## 2023-11-20 NOTE — TELEPHONE ENCOUNTER
This refill request is being sent to the provider for the following reason:  []Patient has not had an appointment within the past 12 months but has made an appointment on: ___  [x]Medication is not within protocol  []Patient did not complete follow up recommendations  []Other: ___    No future appointments.    LOV: 11/23/2022  ASSESSMENT AND PLAN:   1. Tonsillar hypertrophy  Healing well status post tonsillectomy and turbinate reduction.  Pain controlled.  Improved breathing through his nose with medical management as well.     Tonsillar fossa well-healed.  Slight synechiae between right turbinate to septum.     Feeling better with his nose and breathing.  He can see me back if needed.  Should continue his Astelin Claritin-D and Singulair.        The patient indicates understanding of these issues and agrees to the plan.

## 2023-11-27 ENCOUNTER — TELEPHONE (OUTPATIENT)
Dept: INTERNAL MEDICINE CLINIC | Facility: CLINIC | Age: 23
End: 2023-11-27

## 2023-11-27 ENCOUNTER — PATIENT MESSAGE (OUTPATIENT)
Dept: INTERNAL MEDICINE CLINIC | Facility: CLINIC | Age: 23
End: 2023-11-27

## 2023-11-27 RX ORDER — DEXTROAMPHETAMINE SACCHARATE, AMPHETAMINE ASPARTATE MONOHYDRATE, DEXTROAMPHETAMINE SULFATE AND AMPHETAMINE SULFATE 5; 5; 5; 5 MG/1; MG/1; MG/1; MG/1
20 CAPSULE, EXTENDED RELEASE ORAL EVERY MORNING
Qty: 30 CAPSULE | Refills: 0 | Status: SHIPPED | OUTPATIENT
Start: 2023-12-07 | End: 2024-01-06

## 2023-11-27 NOTE — TELEPHONE ENCOUNTER
This refill request is being sent to the provider for the following reason:  []Patient has not had an appointment within the past 12 months but has made an appointment on: ___  [x]Medication is not within protocol  []Patient did not complete follow up recommendations  []Other: ___  Dr. Zhu, please review and send. Thank you.

## 2023-12-06 RX ORDER — LORATADINE PSEUDOEPHEDRINE SULFATE 10; 240 MG/1; MG/1
1 TABLET, EXTENDED RELEASE ORAL EVERY MORNING
Qty: 30 TABLET | Refills: 0 | OUTPATIENT
Start: 2023-12-06

## 2023-12-06 RX ORDER — AZELASTINE 1 MG/ML
2 SPRAY, METERED NASAL 2 TIMES DAILY
Qty: 30 ML | Refills: 0 | Status: SHIPPED | OUTPATIENT
Start: 2023-12-06

## 2023-12-09 RX ORDER — LORATADINE/PSEUDOEPHEDRINE 10MG-240MG
1 TABLET, EXTENDED RELEASE 24 HR ORAL EVERY MORNING
Qty: 30 TABLET | Refills: 0 | OUTPATIENT
Start: 2023-12-09

## 2023-12-09 NOTE — TELEPHONE ENCOUNTER
This refill request is being sent to the provider for the following reason:  []Patient has not had an appointment within the past 12 months but has made an appointment on: ___  [x]Medication is not within protocol  []Patient did not complete follow up recommendations  []Other: ___

## 2023-12-17 RX ORDER — DEXTROAMPHETAMINE SACCHARATE, AMPHETAMINE ASPARTATE MONOHYDRATE, DEXTROAMPHETAMINE SULFATE AND AMPHETAMINE SULFATE 5; 5; 5; 5 MG/1; MG/1; MG/1; MG/1
20 CAPSULE, EXTENDED RELEASE ORAL EVERY MORNING
Qty: 30 CAPSULE | Refills: 0 | Status: CANCELLED | OUTPATIENT
Start: 2023-12-17 | End: 2024-01-16

## 2023-12-19 ENCOUNTER — HOSPITAL ENCOUNTER (OUTPATIENT)
Age: 23
Discharge: HOME | End: 2023-12-19
Attending: EMERGENCY MEDICINE
Payer: COMMERCIAL

## 2023-12-19 ENCOUNTER — PATIENT MESSAGE (OUTPATIENT)
Dept: INTERNAL MEDICINE CLINIC | Facility: CLINIC | Age: 23
End: 2023-12-19

## 2023-12-19 ENCOUNTER — APPOINTMENT (OUTPATIENT)
Dept: GENERAL RADIOLOGY | Age: 23
End: 2023-12-19
Attending: EMERGENCY MEDICINE
Payer: COMMERCIAL

## 2023-12-19 VITALS
OXYGEN SATURATION: 97 % | TEMPERATURE: 99 F | RESPIRATION RATE: 18 BRPM | SYSTOLIC BLOOD PRESSURE: 146 MMHG | DIASTOLIC BLOOD PRESSURE: 67 MMHG | HEART RATE: 60 BPM

## 2023-12-19 DIAGNOSIS — R07.9 CHEST PAIN OF UNCERTAIN ETIOLOGY: Primary | ICD-10-CM

## 2023-12-19 LAB
#MXD IC: 0.5 X10ˆ3/UL (ref 0.1–1)
BUN BLD-MCNC: 15 MG/DL (ref 7–18)
CHLORIDE BLD-SCNC: 101 MMOL/L (ref 98–112)
CO2 BLD-SCNC: 28 MMOL/L (ref 21–32)
CREAT BLD-MCNC: 0.9 MG/DL
DDIMER WHOLE BLOOD: <200 NG/ML DDU (ref ?–400)
EGFRCR SERPLBLD CKD-EPI 2021: 122 ML/MIN/1.73M2 (ref 60–?)
GLUCOSE BLD-MCNC: 92 MG/DL (ref 70–99)
HCT VFR BLD AUTO: 42.7 %
HCT VFR BLD CALC: 45 %
HGB BLD-MCNC: 13.7 G/DL
ISTAT IONIZED CALCIUM FOR CHEM 8: 1.21 MMOL/L (ref 1.12–1.32)
LYMPHOCYTES # BLD AUTO: 2.3 X10ˆ3/UL (ref 1–4)
LYMPHOCYTES NFR BLD AUTO: 22.8 %
MCH RBC QN AUTO: 26.1 PG (ref 26–34)
MCHC RBC AUTO-ENTMCNC: 32.1 G/DL (ref 31–37)
MCV RBC AUTO: 81.5 FL (ref 80–100)
MIXED CELL %: 4.6 %
NEUTROPHILS # BLD AUTO: 7.3 X10ˆ3/UL (ref 1.5–7.7)
NEUTROPHILS NFR BLD AUTO: 72.6 %
PLATELET # BLD AUTO: 333 X10ˆ3/UL (ref 150–450)
POTASSIUM BLD-SCNC: 4.7 MMOL/L (ref 3.6–5.1)
RBC # BLD AUTO: 5.24 X10ˆ6/UL
SODIUM BLD-SCNC: 139 MMOL/L (ref 136–145)
TROPONIN I BLD-MCNC: <0.02 NG/ML
WBC # BLD AUTO: 10.1 X10ˆ3/UL (ref 4–11)

## 2023-12-19 PROCEDURE — 93010 ELECTROCARDIOGRAM REPORT: CPT | Performed by: INTERNAL MEDICINE

## 2023-12-19 PROCEDURE — 80047 BASIC METABLC PNL IONIZED CA: CPT

## 2023-12-19 PROCEDURE — 36415 COLL VENOUS BLD VENIPUNCTURE: CPT

## 2023-12-19 PROCEDURE — 85378 FIBRIN DEGRADE SEMIQUANT: CPT | Performed by: EMERGENCY MEDICINE

## 2023-12-19 PROCEDURE — 93005 ELECTROCARDIOGRAM TRACING: CPT

## 2023-12-19 PROCEDURE — 99215 OFFICE O/P EST HI 40 MIN: CPT

## 2023-12-19 PROCEDURE — 85025 COMPLETE CBC W/AUTO DIFF WBC: CPT | Performed by: EMERGENCY MEDICINE

## 2023-12-19 PROCEDURE — 71101 X-RAY EXAM UNILAT RIBS/CHEST: CPT | Performed by: EMERGENCY MEDICINE

## 2023-12-19 PROCEDURE — 84484 ASSAY OF TROPONIN QUANT: CPT

## 2023-12-19 PROCEDURE — 93010 ELECTROCARDIOGRAM REPORT: CPT

## 2023-12-19 NOTE — TELEPHONE ENCOUNTER
Pt was given rx on 12/7. Too soon for refill    Current refill request refused due to refill is either a duplicate request or has active refills at the pharmacy. Check previous templates. Requested Prescriptions     Pending Prescriptions Disp Refills    Amphetamine-Dextroamphet ER (ADDERALL XR) 20 MG Oral Capsule SR 24 Hr 30 capsule 0     Sig: Take 1 capsule (20 mg total) by mouth every morning.

## 2023-12-20 LAB
ATRIAL RATE: 52 BPM
P AXIS: 33 DEGREES
P-R INTERVAL: 148 MS
Q-T INTERVAL: 416 MS
QRS DURATION: 90 MS
QTC CALCULATION (BEZET): 386 MS
R AXIS: 36 DEGREES
T AXIS: 48 DEGREES
VENTRICULAR RATE: 52 BPM

## 2023-12-20 NOTE — ED INITIAL ASSESSMENT (HPI)
Patient arrived ambulatory to room c/o left sided rib pain that started today. Pain intermittent. No injury. No SOB. No n/v/d. No urinary complaints. No fevers. Easy non labored respirations. No distress.

## 2023-12-21 RX ORDER — LORATADINE PSEUDOEPHEDRINE SULFATE 10; 240 MG/1; MG/1
1 TABLET, EXTENDED RELEASE ORAL EVERY MORNING
Qty: 30 TABLET | Refills: 0 | OUTPATIENT
Start: 2023-12-21

## 2023-12-22 ENCOUNTER — TELEPHONE (OUTPATIENT)
Dept: INTERNAL MEDICINE CLINIC | Facility: CLINIC | Age: 23
End: 2023-12-22

## 2023-12-22 RX ORDER — DEXTROAMPHETAMINE SACCHARATE, AMPHETAMINE ASPARTATE MONOHYDRATE, DEXTROAMPHETAMINE SULFATE AND AMPHETAMINE SULFATE 5; 5; 5; 5 MG/1; MG/1; MG/1; MG/1
20 CAPSULE, EXTENDED RELEASE ORAL EVERY MORNING
Qty: 30 CAPSULE | Refills: 0 | Status: CANCELLED | OUTPATIENT
Start: 2024-01-06 | End: 2024-02-05

## 2023-12-27 RX ORDER — DEXTROAMPHETAMINE SACCHARATE, AMPHETAMINE ASPARTATE MONOHYDRATE, DEXTROAMPHETAMINE SULFATE AND AMPHETAMINE SULFATE 5; 5; 5; 5 MG/1; MG/1; MG/1; MG/1
20 CAPSULE, EXTENDED RELEASE ORAL DAILY
Qty: 30 CAPSULE | Refills: 0 | Status: SHIPPED | OUTPATIENT
Start: 2024-02-05 | End: 2024-03-06

## 2023-12-27 RX ORDER — DEXTROAMPHETAMINE SACCHARATE, AMPHETAMINE ASPARTATE MONOHYDRATE, DEXTROAMPHETAMINE SULFATE AND AMPHETAMINE SULFATE 5; 5; 5; 5 MG/1; MG/1; MG/1; MG/1
20 CAPSULE, EXTENDED RELEASE ORAL DAILY
Qty: 30 CAPSULE | Refills: 0 | Status: SHIPPED | OUTPATIENT
Start: 2024-03-06 | End: 2024-04-05

## 2023-12-27 RX ORDER — DEXTROAMPHETAMINE SACCHARATE, AMPHETAMINE ASPARTATE MONOHYDRATE, DEXTROAMPHETAMINE SULFATE AND AMPHETAMINE SULFATE 5; 5; 5; 5 MG/1; MG/1; MG/1; MG/1
20 CAPSULE, EXTENDED RELEASE ORAL DAILY
Qty: 30 CAPSULE | Refills: 0 | Status: SHIPPED | OUTPATIENT
Start: 2024-01-06 | End: 2024-02-05

## 2023-12-28 NOTE — TELEPHONE ENCOUNTER
Imp- ADHD     Rec- Adderall XR 20 mg po every day #30, 2 RF     ERx sent for 1/6/24 Telephone Encounter by Alla Roldan RN at 08/09/18 12:38 PM     Author:  Alla Roldan RN Service:  (none) Author Type:  Registered Nurse     Filed:  08/09/18 12:38 PM Encounter Date:  8/9/2018 Status:  Signed     :  Alla Roldan RN (Registered Nurse)            Left message on answering machine to call back.[EC1.1T]        Revision History        User Key Date/Time User Provider Type Action    > EC1.1 08/09/18 12:38 PM Alla Roldan RN Registered Nurse Sign    T - Template

## 2024-01-02 NOTE — TELEPHONE ENCOUNTER
This refill request is being sent to the provider for the following reason:  [x]Patient has not had an appointment within the past 12 months, LOV 11/23/2022 ___  []Medication is not within protocol  []Patient did not complete follow up recommendations  []Other: ___      Dr. Zhu, please sign if agreeable. Thank you  Reddithart message sent to patient to schedule annual appointment

## 2024-01-05 ENCOUNTER — TELEPHONE (OUTPATIENT)
Dept: INTERNAL MEDICINE CLINIC | Facility: CLINIC | Age: 24
End: 2024-01-05

## 2024-01-05 DIAGNOSIS — J30.9 ALLERGIC RHINITIS, UNSPECIFIED SEASONALITY, UNSPECIFIED TRIGGER: ICD-10-CM

## 2024-01-05 DIAGNOSIS — J32.9 CHRONIC SINUSITIS, UNSPECIFIED LOCATION: ICD-10-CM

## 2024-01-05 DIAGNOSIS — J35.1 TONSILLAR HYPERTROPHY: Primary | ICD-10-CM

## 2024-01-05 NOTE — TELEPHONE ENCOUNTER
Referral pended awaiting dx and MD agreement. Marked high since patient has upcoming visit on 1/8/24

## 2024-01-05 NOTE — TELEPHONE ENCOUNTER
Patient is calling to request a referral for Dr Gt Zhu.     Gt Zhu MD -ENT  Patient has been Dr Zhu in the past, has an appointment schedule for a follow up.  [address] [place] [address] [id number], [place] [address]  Ph # [phone]    Appointment is currently scheduled for Monday, 1/8/2024.

## 2024-01-08 ENCOUNTER — OFFICE VISIT (OUTPATIENT)
Dept: OTOLARYNGOLOGY | Facility: CLINIC | Age: 24
End: 2024-01-08
Payer: COMMERCIAL

## 2024-01-08 DIAGNOSIS — J30.9 CHRONIC ALLERGIC RHINITIS: ICD-10-CM

## 2024-01-08 DIAGNOSIS — J34.2 NASAL SEPTAL DEVIATION: Primary | ICD-10-CM

## 2024-01-08 DIAGNOSIS — R09.81 NASAL CONGESTION: ICD-10-CM

## 2024-01-08 PROCEDURE — 99213 OFFICE O/P EST LOW 20 MIN: CPT | Performed by: STUDENT IN AN ORGANIZED HEALTH CARE EDUCATION/TRAINING PROGRAM

## 2024-01-08 RX ORDER — AZELASTINE 1 MG/ML
2 SPRAY, METERED NASAL 2 TIMES DAILY
Qty: 30 ML | Refills: 11 | Status: SHIPPED | OUTPATIENT
Start: 2024-01-08

## 2024-01-08 NOTE — PROGRESS NOTES
Adi Abarca is a 23 year old male.   Chief Complaint   Patient presents with    Sinus Problem     Check up on sinus and needs refills of meds       ASSESSMENT AND PLAN:   1. Nasal septal deviation    23-year-old presents in follow-up regarding chronic nasal obstruction and congestion.  I had removed his tonsils and reduce his turbinates which did improve his snoring somewhat.  Although he still largely finds improvement with the Astelin nasal spray and Claritin-D    Exam has a septal deviation to the right with dry overlying mucosa    He does have an underlying septal deviation although he would like to hold off on fixing this.  Will certainly refill his Astelin.  Discussed limiting the use of the Sudafed for longer periods of time.  Suggested over-the-counter oral antihistamines which are helping him.  Can also use the montelukast I previously prescribed.  Like to see him back at least on a yearly basis for the Astelin refills.    2. Chronic allergic rhinitis      3. Nasal congestion        The patient indicates understanding of these issues and agrees to the plan.      EXAM:   There were no vitals taken for this visit.    Pertinent exam findings may also be noted above in assessment and plan     System Details   Skin Inspection - Normal.   Constitutional Overall appearance - Normal.   Head/Face Symmetric, TMJ tenderness not present    Eyes EOMI, PERRL   Right ear:  Canal clear, TM intact, no LIYA   Left ear:  Canal clear, TM intact, no LIYA   Nose: Septum midline, inferior turbinates not enlarged, nasal valves without collapse    Oral cavity/Oropharynx: No lesions or masses on inspection or palpation, tonsils symmetric    Neck: Soft without LAD, thyroid not enlarged  Voice clear/ no stridor   Other:      Scopes and Procedures:             Current Outpatient Medications   Medication Sig Dispense Refill    azelastine 0.1 % Nasal Solution 2 sprays by Nasal route 2 (two) times daily. 30 mL 11     Amphetamine-Dextroamphet ER (ADDERALL XR) 20 MG Oral Capsule SR 24 Hr Take 1 capsule (20 mg total) by mouth daily. 30 capsule 0    [START ON 2/5/2024] Amphetamine-Dextroamphet ER (ADDERALL XR) 20 MG Oral Capsule SR 24 Hr Take 1 capsule (20 mg total) by mouth daily. 30 capsule 0    [START ON 3/6/2024] Amphetamine-Dextroamphet ER (ADDERALL XR) 20 MG Oral Capsule SR 24 Hr Take 1 capsule (20 mg total) by mouth daily. 30 capsule 0    azelastine 0.1 % Nasal Solution 2 sprays by Nasal route 2 (two) times daily. 30 mL 0    fluticasone propionate 50 MCG/ACT Nasal Suspension 2 sprays by Nasal route in the morning and 2 sprays before bedtime. 16 g 3    loratadine-pseudoephedrine ER (CLARITIN-D 24 HOUR)  MG Oral Tablet 24 Hr Take 1 tablet by mouth every morning. 30 tablet 0    EPINEPHrine 0.15 MG/0.3ML Injection Solution Auto-injector Inject 0.6 mL (0.3 mg total) into the muscle as needed for Anaphylaxis. 2 each 1    MONTELUKAST 10 MG Oral Tab Take 1 tablet (10 mg total) by mouth nightly. 90 tablet 3    loratadine 10 MG Oral Tab Take 1 tablet (10 mg total) by mouth nightly.        Past Medical History:   Diagnosis Date    Anaphylaxis     in July 2019    Anesthesia complication     high tolerance and woke up during sedation for wisdom teeth removal    Attention deficit hyperactivity disorder (ADHD)     Visual impairment     wears glasses      Social History:  Social History     Socioeconomic History    Marital status: Single   Tobacco Use    Smoking status: Former     Types: Cigarettes     Quit date: 9/28/2020     Years since quitting: 3.2    Smokeless tobacco: Former   Vaping Use    Vaping Use: Former   Substance and Sexual Activity    Alcohol use: Yes     Comment: social    Drug use: Not Currently     Types: Cannabis     Comment: Marijuana use every other day, last use 2 months ago   Social History Narrative    School:[employer]        Grade:12th        Sports:basketball              Gt Zhu MD  1/8/2024  1:54 PM

## 2024-01-09 ENCOUNTER — PATIENT MESSAGE (OUTPATIENT)
Dept: INTERNAL MEDICINE CLINIC | Facility: CLINIC | Age: 24
End: 2024-01-09

## 2024-01-09 ENCOUNTER — TELEPHONE (OUTPATIENT)
Dept: INTERNAL MEDICINE CLINIC | Facility: CLINIC | Age: 24
End: 2024-01-09

## 2024-01-09 NOTE — TELEPHONE ENCOUNTER
----- Message from Adi Abarca sent at 1/9/2024 11:20 AM CST -----  Regarding: Adderal Dosage   Contact: [phone]  The 20mg xr a day has been successful. However, some days of the week I work later and the adderal wears off. Would it be possible to get a dosage of instant release as well for days I work later?

## 2024-01-09 NOTE — TELEPHONE ENCOUNTER
From: Adi Abarca  To: Rojelio Ji  Sent: 1/9/2024 11:20 AM CST  Subject: Adderal Dosage     The 20mg xr a day has been successful. However, some days of the week I work later and the adderal wears off. Would it be possible to get a dosage of instant release as well for days I work later?

## 2024-01-11 NOTE — TELEPHONE ENCOUNTER
Selena Joshi, I would favor a steady dose of prescribed stimulant medication. I would favor current dose of Adderall XR 20 mg po every day     Dr Ji

## 2024-01-16 RX ORDER — LORATADINE PSEUDOEPHEDRINE SULFATE 10; 240 MG/1; MG/1
1 TABLET, EXTENDED RELEASE ORAL EVERY MORNING
Qty: 30 TABLET | Refills: 0 | OUTPATIENT
Start: 2024-01-16

## 2024-01-16 RX ORDER — AZELASTINE 1 MG/ML
2 SPRAY, METERED NASAL 2 TIMES DAILY
Qty: 30 ML | Refills: 0 | OUTPATIENT
Start: 2024-01-16

## 2024-01-19 RX ORDER — FLUTICASONE PROPIONATE 50 MCG
2 SPRAY, SUSPENSION (ML) NASAL 2 TIMES DAILY
Qty: 16 G | Refills: 3 | Status: SHIPPED | OUTPATIENT
Start: 2024-01-19

## 2024-01-31 ENCOUNTER — PATIENT MESSAGE (OUTPATIENT)
Dept: INTERNAL MEDICINE CLINIC | Facility: CLINIC | Age: 24
End: 2024-01-31

## 2024-01-31 RX ORDER — LORATADINE PSEUDOEPHEDRINE SULFATE 10; 240 MG/1; MG/1
1 TABLET, EXTENDED RELEASE ORAL EVERY MORNING
Qty: 30 TABLET | Refills: 0 | OUTPATIENT
Start: 2024-01-31

## 2024-01-31 NOTE — TELEPHONE ENCOUNTER
From: Adi Abarca  To: Rojelio Ji  Sent: 1/31/2024 12:40 PM CST  Subject: Adderal dosage     Hey Dr. Cannon,    I couldn’t reply to the last message you sent because it was too old. You said you’d favor a steady daily dosage of adderal, which I support. However, I still am lagging a bit on days I work late. 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 Could we increase the xr dosage?

## 2024-02-09 ENCOUNTER — OFFICE VISIT (OUTPATIENT)
Dept: FAMILY MEDICINE CLINIC | Facility: CLINIC | Age: 24
End: 2024-02-09
Payer: COMMERCIAL

## 2024-02-09 VITALS
BODY MASS INDEX: 33.74 KG/M2 | SYSTOLIC BLOOD PRESSURE: 128 MMHG | HEIGHT: 67 IN | DIASTOLIC BLOOD PRESSURE: 59 MMHG | RESPIRATION RATE: 16 BRPM | TEMPERATURE: 99 F | OXYGEN SATURATION: 97 % | WEIGHT: 215 LBS | HEART RATE: 82 BPM

## 2024-02-09 DIAGNOSIS — J02.9 SORE THROAT: ICD-10-CM

## 2024-02-09 DIAGNOSIS — J06.9 VIRAL URI WITH COUGH: Primary | ICD-10-CM

## 2024-02-09 LAB
CONTROL LINE PRESENT WITH A CLEAR BACKGROUND (YES/NO): YES YES/NO
KIT LOT #: NORMAL NUMERIC
OPERATOR ID: NORMAL
RAPID SARS-COV-2 BY PCR: NOT DETECTED
STREP GRP A CUL-SCR: NEGATIVE

## 2024-02-09 PROCEDURE — 3074F SYST BP LT 130 MM HG: CPT | Performed by: NURSE PRACTITIONER

## 2024-02-09 PROCEDURE — 87880 STREP A ASSAY W/OPTIC: CPT | Performed by: NURSE PRACTITIONER

## 2024-02-09 PROCEDURE — 3078F DIAST BP <80 MM HG: CPT | Performed by: NURSE PRACTITIONER

## 2024-02-09 PROCEDURE — 3008F BODY MASS INDEX DOCD: CPT | Performed by: NURSE PRACTITIONER

## 2024-02-09 PROCEDURE — 99213 OFFICE O/P EST LOW 20 MIN: CPT | Performed by: NURSE PRACTITIONER

## 2024-02-09 PROCEDURE — U0002 COVID-19 LAB TEST NON-CDC: HCPCS | Performed by: NURSE PRACTITIONER

## 2024-02-09 RX ORDER — BENZONATATE 200 MG/1
200 CAPSULE ORAL 3 TIMES DAILY PRN
Qty: 15 CAPSULE | Refills: 0 | Status: SHIPPED | OUTPATIENT
Start: 2024-02-09 | End: 2024-02-14

## 2024-02-09 NOTE — PROGRESS NOTES
CHIEF COMPLAINT:     Chief Complaint   Patient presents with    Sore Throat     Sx 3 days - ST, runny nose, dry cough, fever, slight sinus pressure  Denies chills, body aches, chest pain  No Covid test was done  OTC Tylenol and Nyquil       HPI:   Adi Abarca is a 23 year old male who presents for upper respiratory symptoms for  3 days. Patient reports sore throat, rhinorrhea, dry cough, sinus pressure, and tactile fever . Symptoms have been worse since onset.  Treating symptoms with tylenol and nyquil.   Associated symptoms include see above.    Pt denies chills, body aches, chest pain, SOB, n/v/d, abdominal pain, or rash.     Eating and drinking well.     No home covid test.     No known strep, covid, or influenza contacts.     Current Outpatient Medications   Medication Sig Dispense Refill    benzonatate 200 MG Oral Cap Take 1 capsule (200 mg total) by mouth 3 (three) times daily as needed for cough. 15 capsule 0    fluticasone propionate 50 MCG/ACT Nasal Suspension 2 sprays by Nasal route in the morning and 2 sprays before bedtime. 16 g 3    azelastine 0.1 % Nasal Solution 2 sprays by Nasal route 2 (two) times daily. 30 mL 11    Amphetamine-Dextroamphet ER (ADDERALL XR) 20 MG Oral Capsule SR 24 Hr Take 1 capsule (20 mg total) by mouth daily. 30 capsule 0    EPINEPHrine 0.15 MG/0.3ML Injection Solution Auto-injector Inject 0.6 mL (0.3 mg total) into the muscle as needed for Anaphylaxis. 2 each 1    MONTELUKAST 10 MG Oral Tab Take 1 tablet (10 mg total) by mouth nightly. 90 tablet 3    loratadine 10 MG Oral Tab Take 1 tablet (10 mg total) by mouth nightly.      loratadine-pseudoephedrine ER (CLARITIN-D 24 HOUR)  MG Oral Tablet 24 Hr Take 1 tablet by mouth every morning. (Patient not taking: Reported on 2/9/2024) 30 tablet 0      Past Medical History:   Diagnosis Date    Anaphylaxis     in July 2019    Anesthesia complication     high tolerance and woke up during sedation for wisdom teeth removal     Attention deficit hyperactivity disorder (ADHD)     Visual impairment     wears glasses      Past Surgical History:   Procedure Laterality Date    TONSILLECTOMY      WISDOM TEETH REMOVED  2019    X6         Social History     Socioeconomic History    Marital status: Single   Tobacco Use    Smoking status: Former     Types: Cigarettes     Quit date: 9/28/2020     Years since quitting: 3.3    Smokeless tobacco: Former   Vaping Use    Vaping Use: Former   Substance and Sexual Activity    Alcohol use: Yes     Comment: social    Drug use: Not Currently     Types: Cannabis     Comment: Marijuana use every other day, last use 2 months ago   Social History Narrative    School:[employer]        Grade:12th        Sports:basketball             REVIEW OF SYSTEMS:   GENERAL: good appetite  SKIN: no rashes or abnormal skin lesions  HEENT: See HPI  LUNGS: denies shortness of breath or wheezing, See HPI  CARDIOVASCULAR: denies chest pain or palpitations   GI: denies N/V/C or abdominal pain  NEURO: Denies headaches    EXAM:   /59   Pulse 82   Temp 98.9 °F (37.2 °C)   Resp 16   Ht 5' 7\" (1.702 m)   Wt 215 lb (97.5 kg)   SpO2 97%   BMI 33.67 kg/m²   GENERAL: well developed, well nourished,in no apparent distress  SKIN: no rashes,no suspicious lesions  HEAD: atraumatic, normocephalic.  No tenderness on palpation of  sinuses  EYES: conjunctiva clear, EOM intact  EARS: TM's gray, no bulging, no retraction,no fluid, bony landmarks visible  NOSE: Nostrils patent, clear nasal discharge, nasal mucosa pink   THROAT: Oral mucosa pink, moist. Posterior pharynx is not erythematous. no exudates. Tonsils 1/4.    NECK: Supple, non-tender  LUNGS: clear to auscultation bilaterally, no wheezes or rhonchi. Breathing is non labored.  CARDIO: RRR without murmur  EXTREMITIES: no cyanosis, clubbing or edema  LYMPH:  no lymphadenopathy.      Recent Results (from the past 168 hour(s))   Strep A Assay W/Optic    Collection Time: 02/09/24  9:57 AM    Result Value Ref Range    Strep Grp A Screen Negative Negative    Control Line Present with a clear background (yes/no) Yes Yes/No    Kit Lot # [id number] Numeric    Kit Expiration Date 04/22/2025 Date   COVID-19 LAB TEST NON-CDC    Collection Time: 02/09/24  9:57 AM    Specimen: Nares   Result Value Ref Range    Rapid SARS-CoV-2 by PCR Not Detected Not Detected    POCT Lot Number [id number]     POCT Expiration Date 10/07/2025     POCT Procedure Control Control Valid Control Valid     ID [id number]          ASSESSMENT AND PLAN:   Adi Abarca is a 23 year old male who presents with upper respiratory symptoms that are consistent with    ASSESSMENT:   Encounter Diagnoses   Name Primary?    Viral URI with cough Yes    Sore throat        PLAN: Meds as below.  Comfort care as described in Patient Instructions    Meds & Refills for this Visit:  Requested Prescriptions     Signed Prescriptions Disp Refills    benzonatate 200 MG Oral Cap 15 capsule 0     Sig: Take 1 capsule (200 mg total) by mouth 3 (three) times daily as needed for cough.     Rapid strep is negative. Rapid covid is negative.     Rx benzonatate as directed.     Discussed s/s of worsening infection/condition with Patient and importance of prompt medical re-evaluation including when to seek emergency care. Patient voiced understanding    Increase fluids and rest. Teas with honey    May consider OTC tylenol or ibuprofen as needed and directed on packaging for pain/fever    May consider OTC dextromethorphan as needed and directed on packaging as a cough suppressant if not using benzonatate.     May consider OTC  pseudoephedrine as needed and directed on packaging as a nasal decongestant    Risks, benefits, and side effects of medication discussed. Patient verbalized understanding and agreement with treatment plan.     All questions and concerns addressed. Encouraged Patient to call clinic with any questions or concerns.     Patient Instructions    See attached patient care instructions.      The patient indicates understanding of these issues and agrees to the plan.  The patient is asked to return if sx's persist or worsen.

## 2024-02-28 RX ORDER — DEXTROAMPHETAMINE SACCHARATE, AMPHETAMINE ASPARTATE MONOHYDRATE, DEXTROAMPHETAMINE SULFATE AND AMPHETAMINE SULFATE 5; 5; 5; 5 MG/1; MG/1; MG/1; MG/1
20 CAPSULE, EXTENDED RELEASE ORAL DAILY
Qty: 30 CAPSULE | Refills: 0 | OUTPATIENT
Start: 2024-02-28 | End: 2024-03-29

## 2024-02-28 RX ORDER — FLUTICASONE PROPIONATE 50 MCG
2 SPRAY, SUSPENSION (ML) NASAL 2 TIMES DAILY
Qty: 16 G | Refills: 3 | Status: SHIPPED | OUTPATIENT
Start: 2024-02-28

## 2024-02-28 NOTE — TELEPHONE ENCOUNTER
90-day panel sent to [facility] in [place] on 2/9/2024.    Current refill request refused due to refill is either a duplicate request or has active refills at the pharmacy.  Check previous templates.    Requested Prescriptions     Refused Prescriptions Disp Refills    Amphetamine-Dextroamphet ER (ADDERALL XR) 20 MG Oral Capsule SR 24 Hr 30 capsule 0     Sig: Take 1 capsule (20 mg total) by mouth daily.     Refused By: RACHEL CARTAGENA     Reason for Refusal: Duplicate refill request       Discharged

## 2024-03-06 ENCOUNTER — PATIENT MESSAGE (OUTPATIENT)
Dept: INTERNAL MEDICINE CLINIC | Facility: CLINIC | Age: 24
End: 2024-03-06

## 2024-03-07 NOTE — TELEPHONE ENCOUNTER
Spoke with Pauline from [facility].  3/6 Adderall XR prescription canceled.    Pt updated.

## 2024-03-07 NOTE — TELEPHONE ENCOUNTER
To MD:  The above refill request is for a controlled substance.  Please review pended medication order.   Print and sign for staff to fax to pharmacy or prescribe electronically.    Last office visit:4/13/23  Last time refill sent and quantity/refills:2/5/24 qty 30- no refills

## 2024-03-07 NOTE — TELEPHONE ENCOUNTER
Patient is calling he took the paper script to the pharmacy and was told there is an active script at another pharmacy    Patient is requesting the office to call [facility] and cancel the script so he can refill the new one he picked up in the office    Patient is asking the office call him when complete phone [phone]

## 2024-03-07 NOTE — TELEPHONE ENCOUNTER
Please call patient   E[facility] advises prescription cannot be filled until 3/27 or 3/28    Pt states his last refill was 2/5 or 6 and he is due now

## 2024-03-07 NOTE — TELEPHONE ENCOUNTER
Spoke with Obie Knutson.    Their system shows the prescription was \"entered\" at a different [facility] today, but she is unable to determine if it was dispensed or not.    Jerel msg to pt requesting update.

## 2024-03-27 RX ORDER — MONTELUKAST SODIUM 10 MG/1
10 TABLET ORAL NIGHTLY
Qty: 90 TABLET | Refills: 0 | Status: SHIPPED | OUTPATIENT
Start: 2024-03-27

## 2024-03-27 NOTE — TELEPHONE ENCOUNTER
Pt due for physical. Mychart sent     Refill request is for a maintenance medication and has met the criteria specified in the Ambulatory Medication Refill Standing Order for eligibility, visits, laboratory, alerts and was sent to the requested pharmacy.    Requested Prescriptions     Signed Prescriptions Disp Refills    MONTELUKAST 10 MG Oral Tab 90 tablet 0     Sig: TAKE ONE TABLET BY MOUTH AT BEDTIME     Authorizing Provider: CIERA INFATNE     Ordering User: ANTOINE JERONIMO

## 2024-04-04 ENCOUNTER — PATIENT MESSAGE (OUTPATIENT)
Dept: INTERNAL MEDICINE CLINIC | Facility: CLINIC | Age: 24
End: 2024-04-04

## 2024-04-04 ENCOUNTER — TELEPHONE (OUTPATIENT)
Dept: INTERNAL MEDICINE CLINIC | Facility: CLINIC | Age: 24
End: 2024-04-04

## 2024-04-04 RX ORDER — DEXTROAMPHETAMINE SACCHARATE, AMPHETAMINE ASPARTATE MONOHYDRATE, DEXTROAMPHETAMINE SULFATE AND AMPHETAMINE SULFATE 5; 5; 5; 5 MG/1; MG/1; MG/1; MG/1
20 CAPSULE, EXTENDED RELEASE ORAL EVERY MORNING
Qty: 30 CAPSULE | Refills: 0 | Status: CANCELLED | OUTPATIENT
Start: 2024-05-07 | End: 2024-06-06

## 2024-04-04 RX ORDER — DEXTROAMPHETAMINE SACCHARATE, AMPHETAMINE ASPARTATE MONOHYDRATE, DEXTROAMPHETAMINE SULFATE AND AMPHETAMINE SULFATE 5; 5; 5; 5 MG/1; MG/1; MG/1; MG/1
20 CAPSULE, EXTENDED RELEASE ORAL EVERY MORNING
Qty: 30 CAPSULE | Refills: 0 | Status: CANCELLED | OUTPATIENT
Start: 2024-04-06 | End: 2024-05-06

## 2024-04-04 NOTE — TELEPHONE ENCOUNTER
From: Adi Abarca  To: Rojelio Ji  Sent: 4/4/2024 7:14 AM CDT  Subject: Misplaced paper prescription     I was given paper copies of my prescription for 20mg xr. Unfortunately, I have seemed to misplaced my copies! I am so sorry, but is there anyway I can get new copies of the prescription? I am due for a refill in the next 2 days.     ThanksAdi

## 2024-04-04 NOTE — TELEPHONE ENCOUNTER
Adi,     I can send electronic refill for Adderall.  Please message back to which pharmacy you want prescription sent.    Dr Ji

## 2024-04-04 NOTE — TELEPHONE ENCOUNTER
To Dr. CRENSHAW     Please advise     Medication las refilled on 3/7/2024    Medications pended if you agree

## 2024-04-04 NOTE — TELEPHONE ENCOUNTER
ERx sent for Addderal XR 20 mg po every day #30, 2 RF to [facility] in [place] at [address] [address]    ERx going forward due to lost printed Rx    Msg to pt via [employer]

## 2024-04-29 RX ORDER — FLUTICASONE PROPIONATE 50 MCG
2 SPRAY, SUSPENSION (ML) NASAL 2 TIMES DAILY
Qty: 16 G | Refills: 3 | Status: SHIPPED | OUTPATIENT
Start: 2024-04-29

## 2024-05-04 ENCOUNTER — PATIENT MESSAGE (OUTPATIENT)
Dept: INTERNAL MEDICINE CLINIC | Facility: CLINIC | Age: 24
End: 2024-05-04

## 2024-05-06 ENCOUNTER — TELEPHONE (OUTPATIENT)
Dept: INTERNAL MEDICINE CLINIC | Facility: CLINIC | Age: 24
End: 2024-05-06

## 2024-05-06 NOTE — TELEPHONE ENCOUNTER
From: Adi Abarca  Sent: 5/5/2024 9:41 PM CDT  To: [facility] Clinical Staff  Subject: Paper prescription     In addition to the above, I was hoping to revisit adding an additional dosage to my current prescription. I picked up a 2nd job, and my current dosage of 20mg xr a day is simply not cutting it. It’s great for the first part of the day, but it wears off by the time it’s time for my 2nd job. What possible solutions could we find? I think introducing an instant release later in the day would be most ideal. I currently take my dosage around 6:30am, and I find that around 3 it wears off. If I could an instant release to take around that time, which is right before I start my 2nd job, I think that would solve my current issue.

## 2024-05-06 NOTE — TELEPHONE ENCOUNTER
----- Message from Adi Abarca sent at 5/5/2024  9:41 PM CDT -----  Regarding: Paper prescription   Contact: [phone]  In addition to the above, I was hoping to revisit adding an additional dosage to my current prescription. I picked up a 2nd job, and my current dosage of 20mg xr a day is simply not cutting it. It’s great for the first part of the day, but it wears off by the time it’s time for my 2nd job. What possible solutions could we find? I think introducing an instant release later in the day would be most ideal. I currently take my dosage around 6:30am, and I find that around 3 it wears off. If I could an instant release to take around that time, which is right before I start my 2nd job, I think that would solve my current issue.         Adi WALLACE [facility] Clinical Staff (supporting Rojelio Ji MD)2 days ago       I’m moving this month to [place], [place] and I was curious if I could get paper copies for my prescription for adderal until I figure out my pharmacy situation.

## 2024-05-13 NOTE — TELEPHONE ENCOUNTER
Adi WALLACE [facility] Clinical Staff (supporting Rojelio Ji MD)Yesterday (11:12 AM)       Just following up on this, I have not heard back yet. I move this Friday.

## 2024-05-14 NOTE — TELEPHONE ENCOUNTER
Patient called to check status prescription questions re: dose & paper prescriptions     Patient is moving this Friday / May 17  Hoping to  paper prescriptions prior     Please call tomorrow (Wednesday) to advise

## 2024-05-20 RX ORDER — DEXTROAMPHETAMINE SACCHARATE, AMPHETAMINE ASPARTATE MONOHYDRATE, DEXTROAMPHETAMINE SULFATE AND AMPHETAMINE SULFATE 5; 5; 5; 5 MG/1; MG/1; MG/1; MG/1
20 CAPSULE, EXTENDED RELEASE ORAL EVERY MORNING
Qty: 30 CAPSULE | Refills: 0 | Status: CANCELLED | OUTPATIENT
Start: 2024-06-05

## 2024-05-20 NOTE — TELEPHONE ENCOUNTER
Patient called  Asked that current RX for Adderall be moved to [facility], [place], new pharmacy  Patient is not due yet just hoping this can be done before RX needs to be filled  Patient never heard back in time to  prescriptions  Please call patient with any questions   Tasked to RX

## 2024-05-20 NOTE — TELEPHONE ENCOUNTER
To MD:  The above refill request is for a controlled substance.  Please review pended medication order.   Print and sign for staff to fax to pharmacy or prescribe electronically.    Last office visit:4/13/23 5/5/24  next not due til June 6/5 - he wants this sent to the new pharmacy . RN called [facility] and cancelled all outstanding prescriptions for adderall    To

## 2024-05-22 ENCOUNTER — TELEPHONE (OUTPATIENT)
Dept: INTERNAL MEDICINE CLINIC | Facility: CLINIC | Age: 24
End: 2024-05-22

## 2024-05-22 DIAGNOSIS — F98.8 ATTENTION DEFICIT DISORDER, UNSPECIFIED HYPERACTIVITY PRESENCE: ICD-10-CM

## 2024-05-22 RX ORDER — DEXTROAMPHETAMINE SACCHARATE, AMPHETAMINE ASPARTATE MONOHYDRATE, DEXTROAMPHETAMINE SULFATE AND AMPHETAMINE SULFATE 5; 5; 5; 5 MG/1; MG/1; MG/1; MG/1
20 CAPSULE, EXTENDED RELEASE ORAL DAILY
Qty: 30 CAPSULE | Refills: 0 | Status: CANCELLED | OUTPATIENT
Start: 2024-07-07 | End: 2024-08-06

## 2024-05-22 RX ORDER — DEXTROAMPHETAMINE SACCHARATE, AMPHETAMINE ASPARTATE MONOHYDRATE, DEXTROAMPHETAMINE SULFATE AND AMPHETAMINE SULFATE 5; 5; 5; 5 MG/1; MG/1; MG/1; MG/1
20 CAPSULE, EXTENDED RELEASE ORAL DAILY
Qty: 30 CAPSULE | Refills: 0 | Status: CANCELLED | OUTPATIENT
Start: 2024-08-06 | End: 2024-09-05

## 2024-05-22 RX ORDER — AZELASTINE 1 MG/ML
2 SPRAY, METERED NASAL 2 TIMES DAILY
Qty: 30 ML | Refills: 11 | Status: SHIPPED | OUTPATIENT
Start: 2024-05-22

## 2024-05-22 RX ORDER — DEXTROAMPHETAMINE SACCHARATE, AMPHETAMINE ASPARTATE MONOHYDRATE, DEXTROAMPHETAMINE SULFATE AND AMPHETAMINE SULFATE 5; 5; 5; 5 MG/1; MG/1; MG/1; MG/1
20 CAPSULE, EXTENDED RELEASE ORAL DAILY
Qty: 30 CAPSULE | Refills: 0 | Status: CANCELLED | OUTPATIENT
Start: 2024-06-07 | End: 2024-07-07

## 2024-05-22 RX ORDER — FLUTICASONE PROPIONATE 50 MCG
2 SPRAY, SUSPENSION (ML) NASAL 2 TIMES DAILY
Qty: 16 G | Refills: 3 | Status: SHIPPED | OUTPATIENT
Start: 2024-05-22

## 2024-05-22 NOTE — TELEPHONE ENCOUNTER
To Dr. CRENSHAW:    Please see below [employer] message from patient regarding dose change.    Message from Adi Abarca sent at 5/5/2024  9:41 PM CDT -----  Regarding: Paper prescription   Contact: [phone]  In addition to the above, I was hoping to revisit adding an additional dosage to my current prescription. I picked up a 2nd job, and my current dosage of 20mg xr a day is simply not cutting it. It’s great for the first part of the day, but it wears off by the time it’s time for my 2nd job. What possible solutions could we find? I think introducing an instant release later in the day would be most ideal. I currently take my dosage around 6:30am, and I find that around 3 it wears off. If I could an instant release to take around that time, which is right before I start my 2nd job, I think that would solve my current issue.         To MD:  The above refill request is for a controlled substance.  Please review pended medication order.   Print and sign for staff to fax to pharmacy or prescribe electronically.    Last office visit: 4/13/2023  Last time refill sent and quantity/refills:    Last ordered 5/5/2024, #30/0    Per [place]  last dispensed on 5/8/2024, #30/0        Pt moved on 5/17 - requesting Rx's be sent to different pharmacy (Outstanding June Rx was canceled at [facility]).    90 day panel pended for review (with future start date).

## 2024-06-03 NOTE — TELEPHONE ENCOUNTER
Scheduled patient for 6/27/24 Annual Physical.     Patient is requesting Refill on current Adderall prescription. He will be out of medication tomorrow.

## 2024-06-03 NOTE — TELEPHONE ENCOUNTER
Patient has not received a response about dosage change, Message was in my chart. Please call patient and let him know what is going on.

## 2024-06-04 RX ORDER — DEXTROAMPHETAMINE SACCHARATE, AMPHETAMINE ASPARTATE MONOHYDRATE, DEXTROAMPHETAMINE SULFATE AND AMPHETAMINE SULFATE 5; 5; 5; 5 MG/1; MG/1; MG/1; MG/1
20 CAPSULE, EXTENDED RELEASE ORAL DAILY
Qty: 30 CAPSULE | Refills: 0 | Status: SHIPPED | OUTPATIENT
Start: 2024-06-04 | End: 2024-07-04

## 2024-06-26 ENCOUNTER — TELEPHONE (OUTPATIENT)
Dept: INTERNAL MEDICINE CLINIC | Facility: CLINIC | Age: 24
End: 2024-06-26

## 2024-06-27 ENCOUNTER — OFFICE VISIT (OUTPATIENT)
Dept: INTERNAL MEDICINE CLINIC | Facility: CLINIC | Age: 24
End: 2024-06-27

## 2024-06-27 VITALS
SYSTOLIC BLOOD PRESSURE: 130 MMHG | TEMPERATURE: 98 F | WEIGHT: 203 LBS | BODY MASS INDEX: 31.86 KG/M2 | HEART RATE: 64 BPM | HEIGHT: 67 IN | DIASTOLIC BLOOD PRESSURE: 72 MMHG

## 2024-06-27 DIAGNOSIS — Z00.00 PHYSICAL EXAM, ANNUAL: Primary | ICD-10-CM

## 2024-06-27 DIAGNOSIS — J30.9 ALLERGIC RHINITIS, UNSPECIFIED SEASONALITY, UNSPECIFIED TRIGGER: ICD-10-CM

## 2024-06-27 DIAGNOSIS — E66.9 OBESITY, CLASS II, BMI 35-39.9: ICD-10-CM

## 2024-06-27 DIAGNOSIS — F98.8 ATTENTION DEFICIT DISORDER, UNSPECIFIED HYPERACTIVITY PRESENCE: ICD-10-CM

## 2024-06-27 PROCEDURE — 3008F BODY MASS INDEX DOCD: CPT | Performed by: INTERNAL MEDICINE

## 2024-06-27 PROCEDURE — 99395 PREV VISIT EST AGE 18-39: CPT | Performed by: INTERNAL MEDICINE

## 2024-06-27 PROCEDURE — 3075F SYST BP GE 130 - 139MM HG: CPT | Performed by: INTERNAL MEDICINE

## 2024-06-27 PROCEDURE — 3078F DIAST BP <80 MM HG: CPT | Performed by: INTERNAL MEDICINE

## 2024-06-27 RX ORDER — DEXTROAMPHETAMINE SACCHARATE, AMPHETAMINE ASPARTATE MONOHYDRATE, DEXTROAMPHETAMINE SULFATE AND AMPHETAMINE SULFATE 5; 5; 5; 5 MG/1; MG/1; MG/1; MG/1
20 CAPSULE, EXTENDED RELEASE ORAL DAILY
Qty: 30 CAPSULE | Refills: 0 | Status: SHIPPED | OUTPATIENT
Start: 2024-07-04 | End: 2024-08-03

## 2024-06-27 RX ORDER — MONTELUKAST SODIUM 10 MG/1
10 TABLET ORAL NIGHTLY
Qty: 90 TABLET | Refills: 3 | Status: SHIPPED | OUTPATIENT
Start: 2024-06-27

## 2024-06-27 RX ORDER — DEXTROAMPHETAMINE SACCHARATE, AMPHETAMINE ASPARTATE MONOHYDRATE, DEXTROAMPHETAMINE SULFATE AND AMPHETAMINE SULFATE 5; 5; 5; 5 MG/1; MG/1; MG/1; MG/1
20 CAPSULE, EXTENDED RELEASE ORAL DAILY
Qty: 30 CAPSULE | Refills: 0 | Status: SHIPPED | OUTPATIENT
Start: 2024-09-03 | End: 2024-10-03

## 2024-06-27 RX ORDER — DEXTROAMPHETAMINE SACCHARATE, AMPHETAMINE ASPARTATE MONOHYDRATE, DEXTROAMPHETAMINE SULFATE AND AMPHETAMINE SULFATE 5; 5; 5; 5 MG/1; MG/1; MG/1; MG/1
20 CAPSULE, EXTENDED RELEASE ORAL DAILY
Qty: 30 CAPSULE | Refills: 0 | Status: SHIPPED | OUTPATIENT
Start: 2024-08-03 | End: 2024-09-02

## 2024-06-27 NOTE — PROGRESS NOTES
Adi Abarca is a 24 year old male.    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; taking Adderall XR 20 mg po every day for attention deficit disorder; no insomnia; has lost 30 pounds in the last year with combination of exercise and diet discretion      HISTORY:  Past Medical History:    Anaphylaxis    in July 2019    Anesthesia complication    high tolerance and woke up during sedation for wisdom teeth removal    Attention deficit hyperactivity disorder (ADHD)    Visual impairment    wears glasses      Past Surgical History:   Procedure Laterality Date    Tonsillectomy      [place] teeth removed  2019    X6      Family History   Problem Relation Age of Onset    Allergies Mother     Lipids Father     Other (Gout) Father     Heart Disorder Maternal Grandmother         Afib    Cancer Paternal Grandmother         Thyroid    Diabetes Neg     Hypertension Neg     Macular degeneration Neg     Glaucoma Neg       Social History:   Social History     Socioeconomic History    Marital status: Single   Tobacco Use    Smoking status: Former     Current packs/day: 0.00     Types: Cigarettes     Quit date: 9/28/2020     Years since quitting: 3.7    Smokeless tobacco: Former   Vaping Use    Vaping status: Former   Substance and Sexual Activity    Alcohol use: Yes     Comment: social    Drug use: Not Currently     Types: Cannabis     Comment: Marijuana use every other day, last use 2 months ago   Social History Narrative    School:[employer]        Grade:12th        Sports:basketball            Medications (Active prior to today's visit):  Current Outpatient Medications   Medication Sig Dispense Refill    Amphetamine-Dextroamphet ER (ADDERALL XR) 20 MG Oral Capsule SR 24 Hr Take 1 capsule (20 mg total) by mouth daily. 30 capsule 0    azelastine 0.1 % Nasal Solution 2 sprays by Nasal route 2 (two) times daily. 30 mL 11     fluticasone propionate 50 MCG/ACT Nasal Suspension 2 sprays by Nasal route in the morning and 2 sprays before bedtime. 16 g 3    MONTELUKAST 10 MG Oral Tab TAKE ONE TABLET BY MOUTH AT BEDTIME 90 tablet 0    loratadine 10 MG Oral Tab Take 1 tablet (10 mg total) by mouth nightly.         Allergies:  Allergies   Allergen Reactions    Banana ANAPHYLAXIS    Cantaloupe ANAPHYLAXIS    Grape ANAPHYLAXIS    Watermelon ANAPHYLAXIS    Pollen     Dust Mites ITCHING    Ragweed ITCHING               ROS:   Constitutional: no weight loss; no fatigue  ENMT:  Negative for ear drainage, hearing loss and nasal drainage  Eyes:  Negative for eye discharge and vision loss  Cardiovascular:  Negative for chest pain; negative palpitations  Respiratory:  Negative for cough, dyspnea and wheezing  Endocrine:  Negative for abnormal sleep patterns, increased activity, polydipsia and polyphagia  Gastrointestinal:  Negative for abdominal pain, constipation, decreased appetite, diarrhea and vomiting; no melena or hematochezia  Musculoskeletal:  Negative for arthralgias or myalgias  Genitourinary:  Negative for dysuria or polyuria  Hema/Lymph:  Negative for easy bleeding and easy bruising  Integumentary:  Negative for pruritus and rash  Neurological:  Negative for gait disturbance; negative for paresthesias   All other review of systems are negative.        PHYSICAL EXAM:   Blood pressure 130/72, pulse 64, temperature 97.5 °F (36.4 °C), height 5' 7\" (1.702 m), weight 203 lb (92.1 kg).  Constitutional: alert and oriented x3 in no acute distress  HEENT- EOMI, PERRL  Nose/Mouth/Throat: pharynx without erythema; no oral lesions  Neck/Thyroid: neck supple; no thyromegaly  Lymphatics: no lymphadenopathy of neck or groin  Cardiovascular: RRR, S1, S2, no S3 or murmur  Respiratory: lungs without crackles or wheezes  Abdomen: normoactive bowel sounds, soft, non-tender and non-distended  Extremities: no clubbing, cyanosis or edema  Vascular: no carotid  bruits; DP/PT 2/2  Musculoskeletal: Motor 5/5 upper and lower extremities  Neurological: cranial nerves II-XII intact; light touch and proprioception intact  Skin: no rash or ulcerations         ASSESSMENT/PLAN:   Physical exam  Labs in April 2023 were normal     ADHD  Has had indolent sxs x many years with +increased distractibility, +poor concentration, and +increased psychomotor agitation  -improved on Vyvanse 30 mg po qD with less distractibility and improved concentration; CPM for now  -Has had indolent sxs x many years with +increased distractibility, +poor concentration, and +increased psychomotor agitation  -was on Vyvanse 30 mg po qD with less distractibility, though med \"wears off\" around noon  - stopped Vyvanse on 11/9/20  -was on Adderall 20 mg po BID, though sxs \"wore off\"  -now on Adderall XR 20 mg po qD; denies insomnia, or anorexia     Foot pain   Using Superfeet orthotic arch support; seeing podiatrist Dr Jairo Thornton     Anaphylaxis  Occurred in July 2019; suspected to be aggravated by ingestion of grape juice; have refilled Epi-pen 0.3 prn, #2 to have available as emergency; seen by allergist Dr Frausto     Nicotine dependence  vapes nicotine daily--> stopped nicotine daily in 2022; also stopped smoking cannabis recreationally     Allergic rhinitis  On loratadine-D 10/240 mg po every day, and montelukast 10 mg po at bedtime     Tonsillar hypertrophy  status post tonsillectomy and turbinate reduction by ENT Dr Gt Zhu  -on Flonase NS 1 sp EN BID, and montelukast 10 mg po at bedtime, and azelastine 0.1% 2 sp EN BID     Obesity  Body mass index is 31.79 kg/m²..; advised weight loss; check CMP; pt referred to [facility]; A1C 4.9% in April 2023          Cell [phone]  RTC 6 mos     Spent 30 minutes obtaining history, evaluating patient, discussing treatment options, diet, exercise, review of available labs and radiology reports, and completing documentation.                  Orders This  Visit:  No orders of the defined types were placed in this encounter.      Meds This Visit:  Requested Prescriptions      No prescriptions requested or ordered in this encounter       Imaging & Referrals:  None     6/27/2024  Rojelio Ji MD

## 2024-07-15 RX ORDER — FLUTICASONE PROPIONATE 50 MCG
2 SPRAY, SUSPENSION (ML) NASAL 2 TIMES DAILY
Qty: 16 G | Refills: 3 | Status: SHIPPED | OUTPATIENT
Start: 2024-07-15

## 2024-08-17 ENCOUNTER — PATIENT MESSAGE (OUTPATIENT)
Dept: INTERNAL MEDICINE CLINIC | Facility: CLINIC | Age: 24
End: 2024-08-17

## 2024-08-19 NOTE — TELEPHONE ENCOUNTER
From: Adi Abarca  To: Rojelio Ji  Sent: 8/17/2024 5:38 PM CDT  Subject: Adderal dosage     Last time we saw each other we discussed the possibly increasing my dosage for adderal. We opted not to do that at the time. However, I’d like to revisit that and increase my dosage because I feel that it’s not as effective as it could be.

## 2024-10-08 ENCOUNTER — TELEPHONE (OUTPATIENT)
Dept: INTERNAL MEDICINE CLINIC | Facility: CLINIC | Age: 24
End: 2024-10-08

## 2024-10-08 RX ORDER — DEXTROAMPHETAMINE SACCHARATE, AMPHETAMINE ASPARTATE MONOHYDRATE, DEXTROAMPHETAMINE SULFATE AND AMPHETAMINE SULFATE 7.5; 7.5; 7.5; 7.5 MG/1; MG/1; MG/1; MG/1
30 CAPSULE, EXTENDED RELEASE ORAL DAILY
Qty: 30 CAPSULE | Refills: 0 | Status: CANCELLED | OUTPATIENT
Start: 2024-10-09 | End: 2024-11-08

## 2024-10-08 RX ORDER — DEXTROAMPHETAMINE SACCHARATE, AMPHETAMINE ASPARTATE MONOHYDRATE, DEXTROAMPHETAMINE SULFATE AND AMPHETAMINE SULFATE 7.5; 7.5; 7.5; 7.5 MG/1; MG/1; MG/1; MG/1
30 CAPSULE, EXTENDED RELEASE ORAL DAILY
Qty: 30 CAPSULE | Refills: 0 | Status: CANCELLED | OUTPATIENT
Start: 2024-12-10 | End: 2025-01-09

## 2024-10-08 RX ORDER — DEXTROAMPHETAMINE SACCHARATE, AMPHETAMINE ASPARTATE MONOHYDRATE, DEXTROAMPHETAMINE SULFATE AND AMPHETAMINE SULFATE 7.5; 7.5; 7.5; 7.5 MG/1; MG/1; MG/1; MG/1
30 CAPSULE, EXTENDED RELEASE ORAL DAILY
Qty: 30 CAPSULE | Refills: 0 | Status: CANCELLED | OUTPATIENT
Start: 2024-11-09 | End: 2024-12-09

## 2024-10-08 NOTE — TELEPHONE ENCOUNTER
----- Message from [employer]  sent at 10/8/2024  7:28 AM CDT -----  Regarding: Adderal dosage   Contact: [phone]  Temitope Zhao, regarding my adderal dosage. I attempted to take a couple days off the 20mg to get the desired effects again as we most recently discussed and unfortunately that did not resolve the issue like last time. Can we go with the original plan of upgrading my dosage to 30mg xr?

## 2024-10-08 NOTE — TELEPHONE ENCOUNTER
To MD:  The above refill request is for a controlled substance.  Please review pended medication order.   Print and sign for staff to fax to pharmacy or prescribe electronically.    Last office visit:   6/27/24  Last time refill sent and quantity/refills:    9/10/24  #30/0

## 2024-10-09 NOTE — TELEPHONE ENCOUNTER
Pt. Called following up on his [employer] message.  Pt. Informed that Dr. Ji will be in the office today.

## 2024-10-10 NOTE — TELEPHONE ENCOUNTER
D.w pt; will keep Adderal XR 20 mg po every day; no dose changes; pt has Rx; all questions answered; if issues with dose, will need to refer to another physician

## 2024-10-10 NOTE — TELEPHONE ENCOUNTER
Patient is returning Dr Leos call from 10/9/2024.    Please call and advise    Patient is asking if doctor could possibly call him back before 3pm.  If not no worries

## 2024-11-22 NOTE — TELEPHONE ENCOUNTER
Refill request sent to pharmacy on 11/25/24 for Fluticasone, last Office visit on 1/8/24, Dr. Zhu mentions to cont using the Azelastine nasal spray but no mention of Fluticasone. Patient [employer] message sent to patient to see which nasal spray he is using.     Patient confirmed he uses both Azelastine and Flonase.

## 2024-11-25 ENCOUNTER — TELEPHONE (OUTPATIENT)
Dept: INTERNAL MEDICINE CLINIC | Facility: CLINIC | Age: 24
End: 2024-11-25

## 2024-11-25 DIAGNOSIS — F98.8 ATTENTION DEFICIT DISORDER, UNSPECIFIED TYPE: ICD-10-CM

## 2024-11-25 RX ORDER — FLUTICASONE PROPIONATE 50 MCG
SPRAY, SUSPENSION (ML) NASAL
Qty: 16 G | Refills: 2 | Status: SHIPPED | OUTPATIENT
Start: 2024-11-25

## 2024-11-25 RX ORDER — DEXTROAMPHETAMINE SACCHARATE, AMPHETAMINE ASPARTATE MONOHYDRATE, DEXTROAMPHETAMINE SULFATE AND AMPHETAMINE SULFATE 5; 5; 5; 5 MG/1; MG/1; MG/1; MG/1
20 CAPSULE, EXTENDED RELEASE ORAL DAILY
Qty: 30 CAPSULE | Refills: 0 | Status: CANCELLED | OUTPATIENT
Start: 2024-11-25 | End: 2024-12-25

## 2024-11-26 NOTE — TELEPHONE ENCOUNTER
To MD:  The above refill request is for a controlled substance.  Please review pended medication order.   Print and sign for staff to fax to pharmacy or prescribe electronically.    Last office visit:   6/27/24  Last time refill sent and quantity/refills:   11/8/24   #30/0

## 2024-11-27 RX ORDER — DEXTROAMPHETAMINE SACCHARATE, AMPHETAMINE ASPARTATE MONOHYDRATE, DEXTROAMPHETAMINE SULFATE AND AMPHETAMINE SULFATE 5; 5; 5; 5 MG/1; MG/1; MG/1; MG/1
20 CAPSULE, EXTENDED RELEASE ORAL DAILY
Qty: 30 CAPSULE | Refills: 0 | Status: SHIPPED | OUTPATIENT
Start: 2024-12-09 | End: 2025-01-08

## 2024-11-27 RX ORDER — DEXTROAMPHETAMINE SACCHARATE, AMPHETAMINE ASPARTATE MONOHYDRATE, DEXTROAMPHETAMINE SULFATE AND AMPHETAMINE SULFATE 5; 5; 5; 5 MG/1; MG/1; MG/1; MG/1
20 CAPSULE, EXTENDED RELEASE ORAL DAILY
Qty: 30 CAPSULE | Refills: 0 | Status: SHIPPED | OUTPATIENT
Start: 2025-01-09 | End: 2025-02-08

## 2024-11-27 RX ORDER — DEXTROAMPHETAMINE SACCHARATE, AMPHETAMINE ASPARTATE MONOHYDRATE, DEXTROAMPHETAMINE SULFATE AND AMPHETAMINE SULFATE 5; 5; 5; 5 MG/1; MG/1; MG/1; MG/1
20 CAPSULE, EXTENDED RELEASE ORAL DAILY
Qty: 30 CAPSULE | Refills: 0 | Status: SHIPPED | OUTPATIENT
Start: 2025-02-09 | End: 2025-03-11

## 2024-12-10 ENCOUNTER — TELEPHONE (OUTPATIENT)
Dept: INTERNAL MEDICINE CLINIC | Facility: CLINIC | Age: 24
End: 2024-12-10

## 2024-12-10 DIAGNOSIS — I10 ELEVATED HEART RATE WITH ELEVATED BLOOD PRESSURE AND DIAGNOSIS OF HYPERTENSION: ICD-10-CM

## 2024-12-10 DIAGNOSIS — R00.2 PALPITATIONS: Primary | ICD-10-CM

## 2024-12-10 DIAGNOSIS — R00.9 ELEVATED HEART RATE WITH ELEVATED BLOOD PRESSURE AND DIAGNOSIS OF HYPERTENSION: ICD-10-CM

## 2024-12-10 NOTE — TELEPHONE ENCOUNTER
Patient called to request a referral to a cardiologist closer to [facility].     Patient was in [facility] emergency room yesterday for elevated heart rate, and being lightheaded.     Patient stated Emergency provider recommended patient see cardiologist.     Patient # [phone]

## 2024-12-12 NOTE — TELEPHONE ENCOUNTER
Patient calling back checking on status of referral.  Was in ER 12/9/2024 for elevated heart rate. He is hoping to get referral asap.

## 2024-12-12 NOTE — TELEPHONE ENCOUNTER
I  do not know in-network cardiologists near [place], [place]; will forward to managed care to investigate; please then call pt and let me know, and I will process referral    To managed care

## 2024-12-13 NOTE — TELEPHONE ENCOUNTER
Dr. Ji,     Please provide the name of an in  you recommend and the DX on the referral that was pended by your staff.     Thank you

## 2024-12-17 NOTE — TELEPHONE ENCOUNTER
Referral placed for cardiologist Dr Robert Bond    [address] ?[place], [place] [id number] [phone]    Please call pt

## 2025-01-24 ENCOUNTER — TELEPHONE (OUTPATIENT)
Dept: INTERNAL MEDICINE CLINIC | Facility: CLINIC | Age: 25
End: 2025-01-24

## 2025-01-24 NOTE — TELEPHONE ENCOUNTER
Patient is calling and states he hurt his right should while working out about 4 days ago.  Patient states both shoulders hurt but the right one is the one that is worse for sure.    Patient is asking for a recommendation and a referral to see an Orthopedic .   Patient needs to get in as soon as possible.      Patient is asking if he needs to see Dr Ji or can he go straight to the orthopedic.    Please call and advise

## 2025-01-24 NOTE — TELEPHONE ENCOUNTER
Called patient who hurt himself while working out 4 days ago.   Now has shoulder pain - right shoulder worse . RN advised to be evaluated at  - he will go to one close to his area F/U 1/27

## 2025-01-28 NOTE — TELEPHONE ENCOUNTER
Patient is returning nurse call.    Patient did go to  yesterday and the doctor there gave him referral for a specialist.  He is going to  See a specialist for his shoulder today 9am.  Patient does not remember the name of the place or the doctor.

## 2025-01-31 ENCOUNTER — PATIENT MESSAGE (OUTPATIENT)
Dept: INTERNAL MEDICINE CLINIC | Facility: CLINIC | Age: 25
End: 2025-01-31

## 2025-01-31 ENCOUNTER — TELEPHONE (OUTPATIENT)
Dept: INTERNAL MEDICINE CLINIC | Facility: CLINIC | Age: 25
End: 2025-01-31

## 2025-01-31 RX ORDER — DEXTROAMPHETAMINE SACCHARATE, AMPHETAMINE ASPARTATE MONOHYDRATE, DEXTROAMPHETAMINE SULFATE AND AMPHETAMINE SULFATE 5; 5; 5; 5 MG/1; MG/1; MG/1; MG/1
20 CAPSULE, EXTENDED RELEASE ORAL DAILY
Qty: 30 CAPSULE | Refills: 0 | Status: CANCELLED | OUTPATIENT
Start: 2025-03-03 | End: 2025-04-02

## 2025-01-31 RX ORDER — DEXTROAMPHETAMINE SACCHARATE, AMPHETAMINE ASPARTATE MONOHYDRATE, DEXTROAMPHETAMINE SULFATE AND AMPHETAMINE SULFATE 5; 5; 5; 5 MG/1; MG/1; MG/1; MG/1
20 CAPSULE, EXTENDED RELEASE ORAL DAILY
Qty: 30 CAPSULE | Refills: 0 | Status: CANCELLED | OUTPATIENT
Start: 2025-02-09 | End: 2025-03-11

## 2025-01-31 RX ORDER — DEXTROAMPHETAMINE SACCHARATE, AMPHETAMINE ASPARTATE MONOHYDRATE, DEXTROAMPHETAMINE SULFATE AND AMPHETAMINE SULFATE 5; 5; 5; 5 MG/1; MG/1; MG/1; MG/1
20 CAPSULE, EXTENDED RELEASE ORAL DAILY
Qty: 30 CAPSULE | Refills: 0 | Status: CANCELLED | OUTPATIENT
Start: 2025-04-03 | End: 2025-05-03

## 2025-01-31 NOTE — TELEPHONE ENCOUNTER
Adi WALLACE [facility] Clinical Staff (supporting Rojelio Ji MD)3 hours ago (7:06 AM)       I think i accidentally deleted my prescription refill for next month in my chart when i was cleaning up some of the other things in my chart. I need another 3 months worth of refill for my 20mg xr. Thank you.

## 2025-01-31 NOTE — TELEPHONE ENCOUNTER
To MD:  The above refill request is for a controlled substance.  Please review pended medication order.   Print and sign for staff to fax to pharmacy or prescribe electronically.    Last office visit:6/27/2024  Last time refill sent and quantity/refills:1/25/25  # 30    To

## 2025-01-31 NOTE — TELEPHONE ENCOUNTER
Adi,    Since your last fill was for #30 on 1/25/25, you are eligible for refill on 2/24/25.  The [facility] in [place] on [address] has an electronic prescription for Adderall XR 20 mg po every day #30 that may be filled on 2/24/25.  No additional prescriptions were sent at this time.    Dr Ji

## 2025-03-17 ENCOUNTER — PATIENT MESSAGE (OUTPATIENT)
Dept: INTERNAL MEDICINE CLINIC | Facility: CLINIC | Age: 25
End: 2025-03-17

## 2025-03-17 RX ORDER — FLUTICASONE PROPIONATE 50 MCG
SPRAY, SUSPENSION (ML) NASAL
Qty: 16 G | Refills: 2 | OUTPATIENT
Start: 2025-03-17

## 2025-03-17 NOTE — TELEPHONE ENCOUNTER
Adi WALLACE [facility] Clinical Staff (supporting Rojelio Ji MD)37 minutes ago (7:09 AM)       Requesting a refill on my prescription due next week for 20mg xr adderall.

## 2025-03-19 ENCOUNTER — TELEPHONE (OUTPATIENT)
Dept: INTERNAL MEDICINE CLINIC | Facility: CLINIC | Age: 25
End: 2025-03-19

## 2025-03-19 ENCOUNTER — PATIENT MESSAGE (OUTPATIENT)
Dept: INTERNAL MEDICINE CLINIC | Facility: CLINIC | Age: 25
End: 2025-03-19

## 2025-03-19 DIAGNOSIS — J30.9 ALLERGIC RHINITIS, UNSPECIFIED SEASONALITY, UNSPECIFIED TRIGGER: Primary | ICD-10-CM

## 2025-03-19 DIAGNOSIS — J32.9 CHRONIC SINUSITIS, UNSPECIFIED LOCATION: ICD-10-CM

## 2025-03-19 RX ORDER — FLUTICASONE PROPIONATE 50 MCG
SPRAY, SUSPENSION (ML) NASAL
Qty: 16 G | Refills: 2 | OUTPATIENT
Start: 2025-03-19

## 2025-03-19 RX ORDER — AZELASTINE 1 MG/ML
2 SPRAY, METERED NASAL 2 TIMES DAILY
Qty: 30 ML | Refills: 11 | Status: CANCELLED | OUTPATIENT
Start: 2025-03-19

## 2025-03-19 RX ORDER — FLUTICASONE PROPIONATE 50 MCG
SPRAY, SUSPENSION (ML) NASAL
Qty: 16 G | Refills: 2 | Status: CANCELLED | OUTPATIENT
Start: 2025-03-19

## 2025-03-19 NOTE — TELEPHONE ENCOUNTER
Siomara spray refills  (Newest Message First)  View All Conversations on this Encounter  Adi WALLACE [facility] Clinical Staff (supporting Rojelio Ji MD)1 minute ago (1:22 PM)       I am currently prescribed two different nasal sprays from Dr Gt perez and I have been for a couple of years. However, I need to see him yearly to get them re prescribed , but I have no need to visit him. He said I can also get it prescribed from [facility] doctor. Is that something you can help with?

## 2025-03-19 NOTE — TELEPHONE ENCOUNTER
Adi,    Dr Gt Zhu would like to see you for follow-up of your ongoing nasal and sinus issues, Refills for the nasal sprays should come from his office.  I have placed referral to see him at your earliest convenience.    Dr Gt Zhu  [address] [place] [id number]    Call [phone] for appointment    Dr Ji

## 2025-03-26 ENCOUNTER — OFFICE VISIT (OUTPATIENT)
Dept: OTOLARYNGOLOGY | Facility: CLINIC | Age: 25
End: 2025-03-26
Payer: COMMERCIAL

## 2025-03-26 VITALS — BODY MASS INDEX: 32 KG/M2 | HEIGHT: 67 IN

## 2025-03-26 DIAGNOSIS — J34.2 NASAL SEPTAL DEVIATION: Primary | ICD-10-CM

## 2025-03-26 DIAGNOSIS — R09.81 NASAL CONGESTION: ICD-10-CM

## 2025-03-26 DIAGNOSIS — J30.9 CHRONIC ALLERGIC RHINITIS: ICD-10-CM

## 2025-03-26 DIAGNOSIS — R04.0 EPISTAXIS: ICD-10-CM

## 2025-03-26 PROCEDURE — G2211 COMPLEX E/M VISIT ADD ON: HCPCS | Performed by: STUDENT IN AN ORGANIZED HEALTH CARE EDUCATION/TRAINING PROGRAM

## 2025-03-26 PROCEDURE — 99214 OFFICE O/P EST MOD 30 MIN: CPT | Performed by: STUDENT IN AN ORGANIZED HEALTH CARE EDUCATION/TRAINING PROGRAM

## 2025-03-26 RX ORDER — FLUTICASONE PROPIONATE 50 MCG
2 SPRAY, SUSPENSION (ML) NASAL 2 TIMES DAILY
Qty: 16 G | Refills: 11 | Status: SHIPPED | OUTPATIENT
Start: 2025-03-26

## 2025-03-26 RX ORDER — FLUTICASONE PROPIONATE AND SALMETEROL 100; 50 UG/1; UG/1
POWDER RESPIRATORY (INHALATION)
COMMUNITY
Start: 2024-12-18

## 2025-03-26 RX ORDER — AZELASTINE 1 MG/ML
2 SPRAY, METERED NASAL 2 TIMES DAILY
Qty: 30 ML | Refills: 11 | Status: SHIPPED | OUTPATIENT
Start: 2025-03-26

## 2025-03-26 RX ORDER — MUPIROCIN 20 MG/G
1 OINTMENT TOPICAL 2 TIMES DAILY
Qty: 1 EACH | Refills: 1 | Status: SHIPPED | OUTPATIENT
Start: 2025-03-26 | End: 2025-04-09

## 2025-03-26 NOTE — PROGRESS NOTES
Adi Abarca is a 24 year old male.   Chief Complaint   Patient presents with    Follow - Up     Patient here for sinus issues f/up.      HPI:   24-year-old presents in follow-up regarding his nasal congestion.  His allergies are well-managed with Flonase and Astelin nasal spray although he is starting to have some nasal dryness and bleeding    Current Outpatient Medications   Medication Sig Dispense Refill    fluticasone-salmeterol 100-50 MCG/ACT Inhalation Aerosol Powder, Breath Activated fluticasone 100 mcg-salmeteroL 50 mcg/dose blistr powdr for inhalation, [RxNorm: 8539394]      mupirocin 2 % External Ointment Apply 1 Application topically 2 (two) times daily for 14 days. Apply to inside of nasal cavity twice a day 1 each 1    azelastine 0.1 % Nasal Solution 2 sprays by Nasal route 2 (two) times daily. 30 mL 11    fluticasone propionate 50 MCG/ACT Nasal Suspension 2 sprays by Nasal route in the morning and 2 sprays before bedtime. 16 g 11    Amphetamine-Dextroamphet ER (ADDERALL XR) 20 MG Oral Capsule SR 24 Hr Take 1 capsule (20 mg total) by mouth daily. 30 capsule 0    [START ON 4/23/2025] Amphetamine-Dextroamphet ER (ADDERALL XR) 20 MG Oral Capsule SR 24 Hr Take 1 capsule (20 mg total) by mouth daily. 30 capsule 0    [START ON 5/23/2025] Amphetamine-Dextroamphet ER (ADDERALL XR) 20 MG Oral Capsule SR 24 Hr Take 1 capsule (20 mg total) by mouth daily. 30 capsule 0    fluticasone propionate 50 MCG/ACT Nasal Suspension PLACE 2 SPRAYS IN EACH NOSTRIL EVERY MORNING AND BEFORE BEDTIME 16 g 2    montelukast 10 MG Oral Tab Take 1 tablet (10 mg total) by mouth nightly. 90 tablet 3    loratadine 10 MG Oral Tab Take 1 tablet (10 mg total) by mouth nightly.        Past Medical History:    Anaphylaxis    in July 2019    Anesthesia complication    high tolerance and woke up during sedation for wisdom teeth removal    Attention deficit hyperactivity disorder (ADHD)    Visual impairment    wears glasses      Social  History:  Social History     Socioeconomic History    Marital status: Single   Tobacco Use    Smoking status: Former     Current packs/day: 0.00     Types: Cigarettes     Quit date: 2020     Years since quittin.4    Smokeless tobacco: Former   Vaping Use    Vaping status: Former   Substance and Sexual Activity    Alcohol use: Yes     Comment: social    Drug use: Not Currently     Types: Cannabis     Comment: Marijuana use every other day, last use 2 months ago   Social History Narrative    School:[employer]        Grade:12th        Sports:basketball          Past Surgical History:   Procedure Laterality Date    Tonsillectomy      [place] teeth removed  2019    X6         EXAM:   Ht 5' 7\" (1.702 m)   BMI 31.79 kg/m²     System Details   Skin Inspection - Normal.   Constitutional Overall appearance - Normal.   Head/Face Symmetric, TMJ tenderness not present    Eyes EOMI, PERRL   Right ear:  Canal clear, TM intact, no LIYA   Left ear:  Canal clear, TM intact, no LIYA   Nose: Septum slightly deviated with dry overlying mucosa and scabbing of his anterior nasal septum, synechiae between the left turbinate and septum   Oral cavity/Oropharynx: No lesions or masses on inspection or palpation, tonsils symmetric    Neck: Soft without LAD, thyroid not enlarged  Voice clear/ no stridor   Other:      SCOPES AND PROCEDURES:         AUDIOGRAM AND IMAGING:         IMPRESSION:   1. Nasal septal deviation    2. Epistaxis    3. Nasal congestion    4. Chronic allergic rhinitis       Recommendations:  -Dry nasal mucosa likely from the nasal spray use.  Discussed the proper nasal spray use to try to avoid dryness to the septum  -Will trial on topical mupirocin ointment and Ponaris nasal oil for the dryness  -He has a synechiae on the left could consider division of this or septoplasty in the future if desired  -Will send in refills for his Astelin and Flonase    The patient indicates understanding of these issues and agrees to the  plan.  Longitudinal care will be provided    Gt Zhu MD  3/26/2025  9:05 AM

## 2025-04-08 ENCOUNTER — PATIENT MESSAGE (OUTPATIENT)
Dept: INTERNAL MEDICINE CLINIC | Facility: CLINIC | Age: 25
End: 2025-04-08

## 2025-04-08 ENCOUNTER — TELEPHONE (OUTPATIENT)
Dept: INTERNAL MEDICINE CLINIC | Facility: CLINIC | Age: 25
End: 2025-04-08

## 2025-04-08 NOTE — TELEPHONE ENCOUNTER
Adi WALLACE [facility] Clinical Staff (supporting Rojelio Ji MD)3 hours ago (7:07 AM)       I will not be in town from the 13th - 27th. My prescription for 20xr adderal will be due before I come home. Can I request to pick this prescription up before I leave

## 2025-04-10 NOTE — TELEPHONE ENCOUNTER
Spoke with [facility] PharmD and provided verbal Okay to dispense Rx early for travel.    Left VM updating pt and instructed him to follow up with his [facility] pharmacy.

## 2025-05-21 ENCOUNTER — TELEPHONE (OUTPATIENT)
Dept: INTERNAL MEDICINE CLINIC | Facility: CLINIC | Age: 25
End: 2025-05-21

## 2025-05-21 DIAGNOSIS — R00.2 PALPITATIONS: Primary | ICD-10-CM

## 2025-06-14 ENCOUNTER — TELEPHONE (OUTPATIENT)
Dept: INTERNAL MEDICINE CLINIC | Facility: CLINIC | Age: 25
End: 2025-06-14

## 2025-06-14 DIAGNOSIS — F98.8 ATTENTION DEFICIT DISORDER, UNSPECIFIED TYPE: ICD-10-CM

## 2025-06-14 RX ORDER — DEXTROAMPHETAMINE SACCHARATE, AMPHETAMINE ASPARTATE MONOHYDRATE, DEXTROAMPHETAMINE SULFATE AND AMPHETAMINE SULFATE 5; 5; 5; 5 MG/1; MG/1; MG/1; MG/1
20 CAPSULE, EXTENDED RELEASE ORAL DAILY
Qty: 30 CAPSULE | Refills: 0 | Status: CANCELLED | OUTPATIENT
Start: 2025-06-14 | End: 2025-07-14

## 2025-06-16 RX ORDER — FLUTICASONE PROPIONATE 50 MCG
2 SPRAY, SUSPENSION (ML) NASAL 2 TIMES DAILY
Qty: 16 G | Refills: 2 | Status: SHIPPED | OUTPATIENT
Start: 2025-06-16

## 2025-06-16 RX ORDER — FLUTICASONE PROPIONATE AND SALMETEROL 100; 50 UG/1; UG/1
1 POWDER RESPIRATORY (INHALATION) 2 TIMES DAILY
Qty: 3 EACH | Refills: 1 | Status: CANCELLED | OUTPATIENT
Start: 2025-06-16

## 2025-06-16 RX ORDER — DEXTROAMPHETAMINE SACCHARATE, AMPHETAMINE ASPARTATE MONOHYDRATE, DEXTROAMPHETAMINE SULFATE AND AMPHETAMINE SULFATE 5; 5; 5; 5 MG/1; MG/1; MG/1; MG/1
20 CAPSULE, EXTENDED RELEASE ORAL DAILY
Qty: 30 CAPSULE | Refills: 0 | Status: SHIPPED | OUTPATIENT
Start: 2025-08-24 | End: 2025-09-23

## 2025-06-16 RX ORDER — DEXTROAMPHETAMINE SACCHARATE, AMPHETAMINE ASPARTATE MONOHYDRATE, DEXTROAMPHETAMINE SULFATE AND AMPHETAMINE SULFATE 5; 5; 5; 5 MG/1; MG/1; MG/1; MG/1
20 CAPSULE, EXTENDED RELEASE ORAL DAILY
Qty: 30 CAPSULE | Refills: 0 | Status: SHIPPED | OUTPATIENT
Start: 2025-07-24 | End: 2025-08-23

## 2025-06-16 RX ORDER — DEXTROAMPHETAMINE SACCHARATE, AMPHETAMINE ASPARTATE MONOHYDRATE, DEXTROAMPHETAMINE SULFATE AND AMPHETAMINE SULFATE 5; 5; 5; 5 MG/1; MG/1; MG/1; MG/1
20 CAPSULE, EXTENDED RELEASE ORAL DAILY
Qty: 30 CAPSULE | Refills: 0 | Status: SHIPPED | OUTPATIENT
Start: 2025-06-23 | End: 2025-07-23

## 2025-06-16 NOTE — TELEPHONE ENCOUNTER
Advair not previously prescribed by Dr. Ji. Adderall panel pended with dates changed to future dates.     To MD:  The above refill request is for a controlled substance.  Please review pended medication order.   Print and sign for staff to fax to pharmacy or prescribe electronically.    Last office visit:   6/27/24  Last time refill sent and quantity/refills:   5/23/25   #30/0

## (undated) DEVICE — APPLICATOR COTTON TIP 3 10/PK

## (undated) DEVICE — SPLINT NSL SIL DYLE 2 SPT CMPR

## (undated) DEVICE — SUT ETHILON 2-0 FS 664H

## (undated) DEVICE — REFLEX ULTRA 45 WITH INTEGRATED CABLE: Brand: COBLATION

## (undated) DEVICE — SUCTION CANISTER, 3000CC,SAFELINER: Brand: DEROYAL

## (undated) DEVICE — SUT CHROMIC 2-0 CT 801H

## (undated) DEVICE — ELECTROSURGICAL SUCTION COAGULATOR, 10FR

## (undated) DEVICE — SOLUTION IRR BTL 250CC NACL

## (undated) DEVICE — T&A: Brand: MEDLINE INDUSTRIES, INC.

## (undated) DEVICE — NASAL ACCESSORY: Brand: MEDLINE INDUSTRIES, INC.

## (undated) DEVICE — HEAD & NECK: Brand: MEDLINE INDUSTRIES, INC.

## (undated) DEVICE — CATHETER,URETHRAL,REDRUBBER,STRL,12FR: Brand: MEDLINE INDUSTRIES, INC.

## (undated) DEVICE — SUT CHROMIC GUT 3-0 SH G122H

## (undated) DEVICE — GAMMEX® PI HYBRID SIZE 7.5, STERILE POWDER-FREE SURGICAL GLOVE, POLYISOPRENE AND NEOPRENE BLEND: Brand: GAMMEX